# Patient Record
Sex: FEMALE | Race: BLACK OR AFRICAN AMERICAN | NOT HISPANIC OR LATINO
[De-identification: names, ages, dates, MRNs, and addresses within clinical notes are randomized per-mention and may not be internally consistent; named-entity substitution may affect disease eponyms.]

---

## 2017-01-03 ENCOUNTER — APPOINTMENT (OUTPATIENT)
Dept: SURGERY | Facility: CLINIC | Age: 52
End: 2017-01-03

## 2017-01-03 VITALS
DIASTOLIC BLOOD PRESSURE: 87 MMHG | HEIGHT: 71.5 IN | BODY MASS INDEX: 28.93 KG/M2 | SYSTOLIC BLOOD PRESSURE: 147 MMHG | OXYGEN SATURATION: 98 % | WEIGHT: 211.25 LBS | HEART RATE: 80 BPM | TEMPERATURE: 98.7 F

## 2017-01-03 DIAGNOSIS — Z87.19 PERSONAL HISTORY OF OTHER DISEASES OF THE DIGESTIVE SYSTEM: ICD-10-CM

## 2017-01-03 DIAGNOSIS — T14.8 OTHER INJURY OF UNSPECIFIED BODY REGION: ICD-10-CM

## 2017-01-03 DIAGNOSIS — Z86.718 PERSONAL HISTORY OF OTHER VENOUS THROMBOSIS AND EMBOLISM: ICD-10-CM

## 2017-01-03 DIAGNOSIS — Z78.9 OTHER SPECIFIED HEALTH STATUS: ICD-10-CM

## 2017-01-03 DIAGNOSIS — Z82.49 FAMILY HISTORY OF ISCHEMIC HEART DISEASE AND OTHER DISEASES OF THE CIRCULATORY SYSTEM: ICD-10-CM

## 2017-01-03 DIAGNOSIS — G47.9 SLEEP DISORDER, UNSPECIFIED: ICD-10-CM

## 2017-01-03 DIAGNOSIS — K27.5 CHRONIC OR UNSPECIFIED PEPTIC ULCER, SITE UNSPECIFIED, WITH PERFORATION: ICD-10-CM

## 2017-01-03 DIAGNOSIS — Z86.79 PERSONAL HISTORY OF OTHER DISEASES OF THE CIRCULATORY SYSTEM: ICD-10-CM

## 2017-01-03 RX ORDER — OMEPRAZOLE 40 MG/1
40 CAPSULE, DELAYED RELEASE ORAL
Qty: 30 | Refills: 0 | Status: ACTIVE | COMMUNITY
Start: 2016-09-21

## 2017-01-12 ENCOUNTER — EMERGENCY (EMERGENCY)
Facility: HOSPITAL | Age: 52
LOS: 1 days | Discharge: HOME | End: 2017-01-12
Attending: EMERGENCY MEDICINE | Admitting: EMERGENCY MEDICINE
Payer: COMMERCIAL

## 2017-01-12 VITALS
DIASTOLIC BLOOD PRESSURE: 89 MMHG | TEMPERATURE: 98 F | WEIGHT: 201.06 LBS | SYSTOLIC BLOOD PRESSURE: 139 MMHG | RESPIRATION RATE: 20 BRPM | HEART RATE: 84 BPM | HEIGHT: 71 IN | OXYGEN SATURATION: 97 %

## 2017-01-12 DIAGNOSIS — M54.9 DORSALGIA, UNSPECIFIED: ICD-10-CM

## 2017-01-12 DIAGNOSIS — M54.2 CERVICALGIA: ICD-10-CM

## 2017-01-12 DIAGNOSIS — Z98.89 OTHER SPECIFIED POSTPROCEDURAL STATES: Chronic | ICD-10-CM

## 2017-01-12 DIAGNOSIS — Z88.5 ALLERGY STATUS TO NARCOTIC AGENT: ICD-10-CM

## 2017-01-12 DIAGNOSIS — M54.41 LUMBAGO WITH SCIATICA, RIGHT SIDE: ICD-10-CM

## 2017-01-12 DIAGNOSIS — Z98.0 INTESTINAL BYPASS AND ANASTOMOSIS STATUS: Chronic | ICD-10-CM

## 2017-01-12 DIAGNOSIS — E78.5 HYPERLIPIDEMIA, UNSPECIFIED: ICD-10-CM

## 2017-01-12 DIAGNOSIS — Z90.89 ACQUIRED ABSENCE OF OTHER ORGANS: Chronic | ICD-10-CM

## 2017-01-12 DIAGNOSIS — I10 ESSENTIAL (PRIMARY) HYPERTENSION: ICD-10-CM

## 2017-01-12 PROCEDURE — 72125 CT NECK SPINE W/O DYE: CPT

## 2017-01-12 PROCEDURE — 72125 CT NECK SPINE W/O DYE: CPT | Mod: 26

## 2017-01-12 PROCEDURE — 99284 EMERGENCY DEPT VISIT MOD MDM: CPT

## 2017-01-12 PROCEDURE — 99284 EMERGENCY DEPT VISIT MOD MDM: CPT | Mod: 25

## 2017-01-12 RX ORDER — METHOCARBAMOL 500 MG/1
1 TABLET, FILM COATED ORAL
Qty: 9 | Refills: 0 | OUTPATIENT
Start: 2017-01-12 | End: 2017-01-15

## 2017-01-12 RX ORDER — IBUPROFEN 200 MG
800 TABLET ORAL ONCE
Qty: 0 | Refills: 0 | Status: COMPLETED | OUTPATIENT
Start: 2017-01-12 | End: 2017-01-12

## 2017-01-12 RX ORDER — IBUPROFEN 200 MG
1 TABLET ORAL
Qty: 30 | Refills: 0 | OUTPATIENT
Start: 2017-01-12

## 2017-01-12 RX ADMIN — Medication 800 MILLIGRAM(S): at 15:02

## 2017-01-12 RX ADMIN — Medication 800 MILLIGRAM(S): at 15:53

## 2017-01-12 RX ADMIN — Medication 200 MILLIGRAM(S): at 15:53
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
## 2017-01-12 NOTE — ED PROVIDER NOTE - CARE PLAN
Principal Discharge DX:	Acute low back pain with right-sided sciatica, unspecified back pain laterality  Secondary Diagnosis:	Neck pain, acute
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
## 2017-01-12 NOTE — ED ADULT NURSE NOTE - PMH
DVT (deep venous thrombosis)    HLD (hyperlipidemia)    HTN (hypertension)    Pancreatitis    PUD (peptic ulcer disease)
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
## 2017-01-12 NOTE — ED PROVIDER NOTE - ENMT, MLM
Airway patent, Nasal mucosa clear. Mouth with normal mucosa. Throat has no vesicles, no oropharyngeal exudates and uvula is midline. Very mild tenderness to C7-C8, and muscle around it

## 2017-01-12 NOTE — ED PROVIDER NOTE - PSH
H/O Billroth II operation    History of abdominoplasty    History of gastric surgery    History of tonsillectomy

## 2017-01-12 NOTE — ED PROVIDER NOTE - MUSCULOSKELETAL, MLM
Spine appears normal, range of motion is not limited, no muscle or joint tenderness. strength 5/5 in all extremities, sensation intact in all extremities

## 2017-01-17 ENCOUNTER — EMERGENCY (EMERGENCY)
Facility: HOSPITAL | Age: 52
LOS: 1 days | Discharge: HOME | End: 2017-01-17
Attending: EMERGENCY MEDICINE | Admitting: EMERGENCY MEDICINE
Payer: COMMERCIAL

## 2017-01-17 VITALS
SYSTOLIC BLOOD PRESSURE: 170 MMHG | RESPIRATION RATE: 19 BRPM | HEART RATE: 75 BPM | DIASTOLIC BLOOD PRESSURE: 99 MMHG | TEMPERATURE: 98 F | OXYGEN SATURATION: 99 %

## 2017-01-17 VITALS
TEMPERATURE: 98 F | WEIGHT: 213.85 LBS | SYSTOLIC BLOOD PRESSURE: 165 MMHG | RESPIRATION RATE: 18 BRPM | HEART RATE: 83 BPM | HEIGHT: 55 IN | OXYGEN SATURATION: 98 % | DIASTOLIC BLOOD PRESSURE: 95 MMHG

## 2017-01-17 DIAGNOSIS — Z98.89 OTHER SPECIFIED POSTPROCEDURAL STATES: Chronic | ICD-10-CM

## 2017-01-17 DIAGNOSIS — R10.12 LEFT UPPER QUADRANT PAIN: ICD-10-CM

## 2017-01-17 DIAGNOSIS — Z79.899 OTHER LONG TERM (CURRENT) DRUG THERAPY: ICD-10-CM

## 2017-01-17 DIAGNOSIS — R10.9 UNSPECIFIED ABDOMINAL PAIN: ICD-10-CM

## 2017-01-17 DIAGNOSIS — I10 ESSENTIAL (PRIMARY) HYPERTENSION: ICD-10-CM

## 2017-01-17 DIAGNOSIS — Z98.0 INTESTINAL BYPASS AND ANASTOMOSIS STATUS: Chronic | ICD-10-CM

## 2017-01-17 DIAGNOSIS — Z90.89 ACQUIRED ABSENCE OF OTHER ORGANS: Chronic | ICD-10-CM

## 2017-01-17 DIAGNOSIS — Z88.5 ALLERGY STATUS TO NARCOTIC AGENT: ICD-10-CM

## 2017-01-17 DIAGNOSIS — E78.00 PURE HYPERCHOLESTEROLEMIA, UNSPECIFIED: ICD-10-CM

## 2017-01-17 DIAGNOSIS — Z79.01 LONG TERM (CURRENT) USE OF ANTICOAGULANTS: ICD-10-CM

## 2017-01-17 DIAGNOSIS — K56.60 UNSPECIFIED INTESTINAL OBSTRUCTION: Chronic | ICD-10-CM

## 2017-01-17 PROCEDURE — 74177 CT ABD & PELVIS W/CONTRAST: CPT | Mod: 26

## 2017-01-17 PROCEDURE — 99285 EMERGENCY DEPT VISIT HI MDM: CPT

## 2017-01-17 PROCEDURE — 71020: CPT | Mod: 26

## 2017-01-17 RX ORDER — IOHEXOL 300 MG/ML
50 INJECTION, SOLUTION INTRAVENOUS ONCE
Qty: 0 | Refills: 0 | Status: COMPLETED | OUTPATIENT
Start: 2017-01-17 | End: 2017-01-17

## 2017-01-17 RX ORDER — MORPHINE SULFATE 50 MG/1
2 CAPSULE, EXTENDED RELEASE ORAL ONCE
Qty: 0 | Refills: 0 | Status: DISCONTINUED | OUTPATIENT
Start: 2017-01-17 | End: 2017-01-17

## 2017-01-17 RX ADMIN — MORPHINE SULFATE 2 MILLIGRAM(S): 50 CAPSULE, EXTENDED RELEASE ORAL at 14:13

## 2017-01-17 RX ADMIN — IOHEXOL 50 MILLILITER(S): 300 INJECTION, SOLUTION INTRAVENOUS at 14:13

## 2017-01-17 NOTE — CONSULT NOTE ADULT - PROBLEM SELECTOR RECOMMENDATION 9
no surgical intervention at this time  patient should see a gastroenterologist for EGD and biopsy/workup of suspicious area  if patient does not have a gastroenterologist she should see her PMD or Dr. Morelos for referral  Follow up with Dr. Morelos for local wound care as planned  Discussed with chief resident on call and attending
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
## 2017-01-17 NOTE — ED PROVIDER NOTE - GASTROINTESTINAL, MLM
Abdomen soft, mild left sided abd tenderness. no guarding. no rebound. no cva tenderness. ulcer present below umbilicus. no erythema

## 2017-01-17 NOTE — ED ADULT TRIAGE NOTE - CHIEF COMPLAINT QUOTE
Pt reports LUQ pain x 1 week ,  status post fall two week ago, was seen in the ED  a week ago .denies N/V , CP and discomfort.
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
## 2017-01-17 NOTE — ED PROVIDER NOTE - MEDICAL DECISION MAKING DETAILS
left sided abd pain. pt well appearing. labs noted. lipase negative. pain improved with meds in ED. ct scan done and ? abnormal loop of small bowel. surgery consulted and dispo pending surgical consult.
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
## 2017-01-17 NOTE — CONSULT NOTE ADULT - ASSESSMENT
60 yo female s/p Bilroth II with left sided flank pain after fall with incidental finding of mildly thickened small bowel loop asymmetrically on afferent loop.

## 2017-01-17 NOTE — ED PROVIDER NOTE - PROGRESS NOTE DETAILS
As per Surgery pt cleared for discharge, will recommend patient to follow up with a gastroenterologist for endoscopy for thickened loop of small bowel , however not needed on an emergent basis, will provide pt with gi follow up and pt to also f/u with dr. Morelos

## 2017-02-13 ENCOUNTER — APPOINTMENT (OUTPATIENT)
Dept: OBGYN | Facility: CLINIC | Age: 52
End: 2017-02-13

## 2017-02-19 ENCOUNTER — INPATIENT (INPATIENT)
Facility: HOSPITAL | Age: 52
LOS: 9 days | Discharge: HOME | DRG: 857 | End: 2017-03-01
Attending: SURGERY | Admitting: SURGERY
Payer: COMMERCIAL

## 2017-02-19 VITALS
DIASTOLIC BLOOD PRESSURE: 96 MMHG | TEMPERATURE: 98 F | OXYGEN SATURATION: 100 % | HEART RATE: 71 BPM | RESPIRATION RATE: 18 BRPM | WEIGHT: 218.7 LBS | SYSTOLIC BLOOD PRESSURE: 162 MMHG | HEIGHT: 71 IN

## 2017-02-19 DIAGNOSIS — Z90.89 ACQUIRED ABSENCE OF OTHER ORGANS: Chronic | ICD-10-CM

## 2017-02-19 DIAGNOSIS — Z98.0 INTESTINAL BYPASS AND ANASTOMOSIS STATUS: Chronic | ICD-10-CM

## 2017-02-19 DIAGNOSIS — K56.60 UNSPECIFIED INTESTINAL OBSTRUCTION: Chronic | ICD-10-CM

## 2017-02-19 DIAGNOSIS — L91.0 HYPERTROPHIC SCAR: ICD-10-CM

## 2017-02-19 DIAGNOSIS — Z98.89 OTHER SPECIFIED POSTPROCEDURAL STATES: Chronic | ICD-10-CM

## 2017-02-19 LAB
ALBUMIN SERPL ELPH-MCNC: 3.3 G/DL — LOW (ref 3.4–5)
ALP SERPL-CCNC: 113 U/L — SIGNIFICANT CHANGE UP (ref 40–120)
ALT FLD-CCNC: 19 U/L — SIGNIFICANT CHANGE UP (ref 12–42)
ANION GAP SERPL CALC-SCNC: 5 MMOL/L — LOW (ref 9–16)
AST SERPL-CCNC: 41 U/L — HIGH (ref 15–37)
BASOPHILS NFR BLD AUTO: 0.5 % — SIGNIFICANT CHANGE UP (ref 0–2)
BILIRUB SERPL-MCNC: 0.4 MG/DL — SIGNIFICANT CHANGE UP (ref 0.2–1.2)
BUN SERPL-MCNC: 13 MG/DL — SIGNIFICANT CHANGE UP (ref 7–23)
CALCIUM SERPL-MCNC: 9.3 MG/DL — SIGNIFICANT CHANGE UP (ref 8.5–10.5)
CHLORIDE SERPL-SCNC: 104 MMOL/L — SIGNIFICANT CHANGE UP (ref 96–108)
CO2 SERPL-SCNC: 28 MMOL/L — SIGNIFICANT CHANGE UP (ref 22–31)
CREAT SERPL-MCNC: 0.67 MG/DL — SIGNIFICANT CHANGE UP (ref 0.5–1.3)
EOSINOPHIL NFR BLD AUTO: 0.9 % — SIGNIFICANT CHANGE UP (ref 0–6)
GLUCOSE SERPL-MCNC: 101 MG/DL — HIGH (ref 70–99)
HCT VFR BLD CALC: 38.1 % — SIGNIFICANT CHANGE UP (ref 34.5–45)
HGB BLD-MCNC: 11.9 G/DL — SIGNIFICANT CHANGE UP (ref 11.5–15.5)
LYMPHOCYTES # BLD AUTO: 42.8 % — SIGNIFICANT CHANGE UP (ref 13–44)
MCHC RBC-ENTMCNC: 23.8 PG — LOW (ref 27–34)
MCHC RBC-ENTMCNC: 31.2 G/DL — LOW (ref 32–36)
MCV RBC AUTO: 76 FL — LOW (ref 80–100)
MONOCYTES NFR BLD AUTO: 7.8 % — SIGNIFICANT CHANGE UP (ref 2–14)
NEUTROPHILS NFR BLD AUTO: 48 % — SIGNIFICANT CHANGE UP (ref 43–77)
PLATELET # BLD AUTO: 314 K/UL — SIGNIFICANT CHANGE UP (ref 150–400)
POTASSIUM SERPL-MCNC: 5.1 MMOL/L — SIGNIFICANT CHANGE UP (ref 3.5–5.3)
POTASSIUM SERPL-SCNC: 5.1 MMOL/L — SIGNIFICANT CHANGE UP (ref 3.5–5.3)
PROT SERPL-MCNC: 8.3 G/DL — HIGH (ref 6.4–8.2)
RBC # BLD: 5.01 M/UL — SIGNIFICANT CHANGE UP (ref 3.8–5.2)
RBC # FLD: 16.1 % — SIGNIFICANT CHANGE UP (ref 10.3–16.9)
SODIUM SERPL-SCNC: 137 MMOL/L — SIGNIFICANT CHANGE UP (ref 135–145)
WBC # BLD: 5.8 K/UL — SIGNIFICANT CHANGE UP (ref 3.8–10.5)
WBC # FLD AUTO: 5.8 K/UL — SIGNIFICANT CHANGE UP (ref 3.8–10.5)

## 2017-02-19 PROCEDURE — 99285 EMERGENCY DEPT VISIT HI MDM: CPT | Mod: 25

## 2017-02-19 PROCEDURE — 93010 ELECTROCARDIOGRAM REPORT: CPT

## 2017-02-19 NOTE — ED PROVIDER NOTE - OBJECTIVE STATEMENT
62 y/o f hx billroth II procedure in 2014 with residual wound which didn't completely heal with continuous drainage presents stating she saw Dr. Morelos this past week who has been following her and was advised to come to ED if drainage persists, which she reports it has.  Denies fever, abd pain, n/v/d, all other ROS negative.

## 2017-02-19 NOTE — ED PROVIDER NOTE - ATTENDING CONTRIBUTION TO CARE
60 yo female with non healing abd wound from billroth II 2014- no fevers or chills no s/s of sepsis will req  admission for debridement and wound closure

## 2017-02-19 NOTE — ED ADULT NURSE NOTE - OBJECTIVE STATEMENT
Patient presents with small wound to abdomen that is located at end of surgical scar from a surgery in 2014. Would has purulent drainage. Will continue to monitor patient.

## 2017-02-19 NOTE — ED PROVIDER NOTE - MEDICAL DECISION MAKING DETAILS
62 y/o f with drainage from wound from surgical incision; afebrile and wound does not appear infected, surgery contacted as Dr. Morelos advised pt to come to ED, labs sent.  Pt had CT a/p 1 month ago, will not repeat at this time.  Will f/u surgery recommendations.

## 2017-02-19 NOTE — ED ADULT TRIAGE NOTE - CHIEF COMPLAINT QUOTE
wound check, pt had abdominal sx 2014 , in the operative area there a small wound not been closed, possible infected as per her

## 2017-02-19 NOTE — ED PROVIDER NOTE - GASTROINTESTINAL, MLM
abdomen soft, no tenderness, midline incision healed apart from 1 cm opening in lower abd with serous drainage, no pus, no tenderness, no erythema, no fluctuance

## 2017-02-20 ENCOUNTER — APPOINTMENT (OUTPATIENT)
Dept: SURGERY | Facility: HOSPITAL | Age: 52
End: 2017-02-20

## 2017-02-20 DIAGNOSIS — Z98.890 OTHER SPECIFIED POSTPROCEDURAL STATES: Chronic | ICD-10-CM

## 2017-02-20 DIAGNOSIS — Z98.0 INTESTINAL BYPASS AND ANASTOMOSIS STATUS: Chronic | ICD-10-CM

## 2017-02-20 LAB
ANION GAP SERPL CALC-SCNC: 8 MMOL/L — LOW (ref 9–16)
APTT BLD: 32.3 SEC — SIGNIFICANT CHANGE UP (ref 27.5–37.4)
BUN SERPL-MCNC: 12 MG/DL — SIGNIFICANT CHANGE UP (ref 7–23)
CALCIUM SERPL-MCNC: 8.7 MG/DL — SIGNIFICANT CHANGE UP (ref 8.5–10.5)
CHLORIDE SERPL-SCNC: 103 MMOL/L — SIGNIFICANT CHANGE UP (ref 96–108)
CO2 SERPL-SCNC: 28 MMOL/L — SIGNIFICANT CHANGE UP (ref 22–31)
CREAT SERPL-MCNC: 0.52 MG/DL — SIGNIFICANT CHANGE UP (ref 0.5–1.3)
GLUCOSE SERPL-MCNC: 92 MG/DL — SIGNIFICANT CHANGE UP (ref 70–99)
HCT VFR BLD CALC: 36.7 % — SIGNIFICANT CHANGE UP (ref 34.5–45)
HGB BLD-MCNC: 11.1 G/DL — LOW (ref 11.5–15.5)
MAGNESIUM SERPL-MCNC: 2 MG/DL — SIGNIFICANT CHANGE UP (ref 1.6–2.4)
MCHC RBC-ENTMCNC: 23.3 PG — LOW (ref 27–34)
MCHC RBC-ENTMCNC: 30.2 G/DL — LOW (ref 32–36)
MCV RBC AUTO: 77.1 FL — LOW (ref 80–100)
PHOSPHATE SERPL-MCNC: 4 MG/DL — SIGNIFICANT CHANGE UP (ref 2.5–4.5)
PLATELET # BLD AUTO: 267 K/UL — SIGNIFICANT CHANGE UP (ref 150–400)
POTASSIUM SERPL-MCNC: 3.6 MMOL/L — SIGNIFICANT CHANGE UP (ref 3.5–5.3)
POTASSIUM SERPL-SCNC: 3.6 MMOL/L — SIGNIFICANT CHANGE UP (ref 3.5–5.3)
RBC # BLD: 4.76 M/UL — SIGNIFICANT CHANGE UP (ref 3.8–5.2)
RBC # FLD: 16 % — SIGNIFICANT CHANGE UP (ref 10.3–16.9)
SODIUM SERPL-SCNC: 139 MMOL/L — SIGNIFICANT CHANGE UP (ref 135–145)
WBC # BLD: 4.7 K/UL — SIGNIFICANT CHANGE UP (ref 3.8–10.5)
WBC # FLD AUTO: 4.7 K/UL — SIGNIFICANT CHANGE UP (ref 3.8–10.5)

## 2017-02-20 PROCEDURE — 99222 1ST HOSP IP/OBS MODERATE 55: CPT | Mod: 57,GC

## 2017-02-20 PROCEDURE — 97605 NEG PRS WND THER DME<=50SQCM: CPT | Mod: GC

## 2017-02-20 PROCEDURE — 11000 DBRDMT ECZ/INFECTED SKIN<10%: CPT | Mod: 59,GC

## 2017-02-20 RX ORDER — HYDROMORPHONE HYDROCHLORIDE 2 MG/ML
0.5 INJECTION INTRAMUSCULAR; INTRAVENOUS; SUBCUTANEOUS
Qty: 0 | Refills: 0 | Status: DISCONTINUED | OUTPATIENT
Start: 2017-02-20 | End: 2017-02-20

## 2017-02-20 RX ORDER — HYDROMORPHONE HYDROCHLORIDE 2 MG/ML
0.5 INJECTION INTRAMUSCULAR; INTRAVENOUS; SUBCUTANEOUS EVERY 4 HOURS
Qty: 0 | Refills: 0 | Status: DISCONTINUED | OUTPATIENT
Start: 2017-02-20 | End: 2017-02-21

## 2017-02-20 RX ORDER — HEPARIN SODIUM 5000 [USP'U]/ML
5000 INJECTION INTRAVENOUS; SUBCUTANEOUS EVERY 8 HOURS
Qty: 0 | Refills: 0 | Status: DISCONTINUED | OUTPATIENT
Start: 2017-02-20 | End: 2017-03-01

## 2017-02-20 RX ORDER — INFLUENZA VIRUS VACCINE 15; 15; 15; 15 UG/.5ML; UG/.5ML; UG/.5ML; UG/.5ML
0.5 SUSPENSION INTRAMUSCULAR ONCE
Qty: 0 | Refills: 0 | Status: COMPLETED | OUTPATIENT
Start: 2017-02-20 | End: 2017-02-20

## 2017-02-20 RX ORDER — HYDROMORPHONE HYDROCHLORIDE 2 MG/ML
0.5 INJECTION INTRAMUSCULAR; INTRAVENOUS; SUBCUTANEOUS EVERY 4 HOURS
Qty: 0 | Refills: 0 | Status: DISCONTINUED | OUTPATIENT
Start: 2017-02-20 | End: 2017-02-20

## 2017-02-20 RX ORDER — METOCLOPRAMIDE HCL 10 MG
10 TABLET ORAL ONCE
Qty: 0 | Refills: 0 | Status: DISCONTINUED | OUTPATIENT
Start: 2017-02-20 | End: 2017-02-27

## 2017-02-20 RX ORDER — HYDROMORPHONE HYDROCHLORIDE 2 MG/ML
1 INJECTION INTRAMUSCULAR; INTRAVENOUS; SUBCUTANEOUS EVERY 4 HOURS
Qty: 0 | Refills: 0 | Status: DISCONTINUED | OUTPATIENT
Start: 2017-02-20 | End: 2017-02-21

## 2017-02-20 RX ORDER — AMPICILLIN SODIUM AND SULBACTAM SODIUM 250; 125 MG/ML; MG/ML
3 INJECTION, POWDER, FOR SUSPENSION INTRAMUSCULAR; INTRAVENOUS EVERY 6 HOURS
Qty: 0 | Refills: 0 | Status: DISCONTINUED | OUTPATIENT
Start: 2017-02-20 | End: 2017-02-22

## 2017-02-20 RX ORDER — ONDANSETRON 8 MG/1
4 TABLET, FILM COATED ORAL EVERY 6 HOURS
Qty: 0 | Refills: 0 | Status: DISCONTINUED | OUTPATIENT
Start: 2017-02-20 | End: 2017-03-01

## 2017-02-20 RX ORDER — POTASSIUM CHLORIDE 20 MEQ
10 PACKET (EA) ORAL
Qty: 0 | Refills: 0 | Status: COMPLETED | OUTPATIENT
Start: 2017-02-20 | End: 2017-02-20

## 2017-02-20 RX ORDER — SODIUM CHLORIDE 9 MG/ML
1000 INJECTION, SOLUTION INTRAVENOUS
Qty: 0 | Refills: 0 | Status: DISCONTINUED | OUTPATIENT
Start: 2017-02-20 | End: 2017-02-21

## 2017-02-20 RX ADMIN — ONDANSETRON 4 MILLIGRAM(S): 8 TABLET, FILM COATED ORAL at 21:26

## 2017-02-20 RX ADMIN — HYDROMORPHONE HYDROCHLORIDE 0.5 MILLIGRAM(S): 2 INJECTION INTRAMUSCULAR; INTRAVENOUS; SUBCUTANEOUS at 18:24

## 2017-02-20 RX ADMIN — Medication 100 MILLIEQUIVALENT(S): at 15:28

## 2017-02-20 RX ADMIN — HYDROMORPHONE HYDROCHLORIDE 0.5 MILLIGRAM(S): 2 INJECTION INTRAMUSCULAR; INTRAVENOUS; SUBCUTANEOUS at 18:25

## 2017-02-20 RX ADMIN — HYDROMORPHONE HYDROCHLORIDE 0.5 MILLIGRAM(S): 2 INJECTION INTRAMUSCULAR; INTRAVENOUS; SUBCUTANEOUS at 17:56

## 2017-02-20 RX ADMIN — Medication 100 MILLIEQUIVALENT(S): at 12:04

## 2017-02-20 RX ADMIN — HEPARIN SODIUM 5000 UNIT(S): 5000 INJECTION INTRAVENOUS; SUBCUTANEOUS at 05:55

## 2017-02-20 RX ADMIN — SODIUM CHLORIDE 100 MILLILITER(S): 9 INJECTION, SOLUTION INTRAVENOUS at 06:00

## 2017-02-20 RX ADMIN — HEPARIN SODIUM 5000 UNIT(S): 5000 INJECTION INTRAVENOUS; SUBCUTANEOUS at 21:26

## 2017-02-20 RX ADMIN — Medication 100 MILLIEQUIVALENT(S): at 13:42

## 2017-02-20 RX ADMIN — HEPARIN SODIUM 5000 UNIT(S): 5000 INJECTION INTRAVENOUS; SUBCUTANEOUS at 13:42

## 2017-02-20 NOTE — H&P ADULT. - ASSESSMENT
61F pt with extensive surgical history now with chronic incisional granulating ulcer    -Admit to Surgery, [facility], [facility]  -NPO  -IVF  -PRN pain meds  -Patient states she has not taken her antihypertensive meds(hydrochlorothizide) since 2014 as her PCP told her she didnt need them, will reassess need for medications  -Add on for after 2pm for Dr. Mena, Exploration of abdominal wall, wound debridement, placement of VAC 61F pt with extensive surgical history now with chronic incisional granulating ulcer    -Admit to Surgery, [facility], [facility]  -NPO  -IVF  -PRN pain meds  -Patient states she has not taken her antihypertensive meds(hydrochlorothizide) since 2014 as her PCP told her she didnt need them, will reassess need for medications  -Add on for after 2pm for Dr. Morelos/Rajesh, Exploration of abdominal wall, wound debridement, placement of VAC

## 2017-02-20 NOTE — H&P ADULT. - ATTENDING COMMENTS
Patient seen and examined at bedside. Agree with above.  To OR for wound exploration/debridement, VAC placement.

## 2017-02-20 NOTE — PROGRESS NOTE ADULT - SUBJECTIVE AND OBJECTIVE BOX
O/N: admitted, added onto OR schedule    SUBJECTIVE:  Flatus: [ ] YES [ ] NO             Bowel Movement: [ ] YES [ ] NO  Pain (0-10):            Pain Control Adequate: [x ] YES [ ] NO  Nausea: [ ] YES [ x] NO            Vomiting: [ ] YES [ x] NO  Diarrhea: [ ] YES [ ] NO         Constipation: [ ] YES [ ] NO     Chest Pain: [ ] YES [x ] NO    SOB:  [ ] YES [ x] NO    MEDICATIONS  (STANDING):  heparin  Injectable 5000Unit(s) SubCutaneous every 8 hours  lactated ringers. 1000milliLiter(s) IV Continuous <Continuous>    MEDICATIONS  (PRN):  HYDROmorphone  Injectable 0.5milliGRAM(s) IV Push every 4 hours PRN Severe Pain (7 - 10)  ondansetron Injectable 4milliGRAM(s) IV Push every 6 hours PRN Nausea      Vital Signs Last 24 Hrs  T(C): 36.8, Max: 36.8 (02-19 @ 23:38)  T(F): 98.2, Max: 98.2 (02-19 @ 23:38)  HR: 72 (67 - 79)  BP: 141/95 (125/74 - 162/96)  BP(mean): --  RR: 16 (16 - 18)  SpO2: 95% (95% - 100%)    Physical Exam:  General: NAD, resting comfortably in bed  Pulmonary: Nonlabored breathing, no respiratory distress  Cardiovascular: NSR  Abdominal: soft, NT/ND, dry ulcer at bottom of midline scar  Extremities: WWP, normal strength  Neuro: A/O x 3, CNs II-XII grossly intact, no focal deficits, normal motor/sensation  Pulses: palpable distal pulses    I&O's Summary    I & Os for current day (as of 20 Feb 2017 11:22)  =============================================  IN: 350 ml / OUT: 0 ml / NET: 350 ml      LABS:                        11.1   4.7   )-----------( 267      ( 20 Feb 2017 06:58 )             36.7     20 Feb 2017 06:58    139    |  103    |  12     ----------------------------<  92     3.6     |  28     |  0.52     Ca    8.7        20 Feb 2017 06:58  Phos  4.0       20 Feb 2017 06:58  Mg     2.0       20 Feb 2017 06:58    TPro  8.3    /  Alb  3.3    /  TBili  0.4    /  DBili  x      /  AST  41     /  ALT  19     /  AlkPhos  113    19 Feb 2017 22:10    PTT - ( 20 Feb 2017 06:58 )  PTT:32.3 sec    CAPILLARY BLOOD GLUCOSE    LIVER FUNCTIONS - ( 19 Feb 2017 22:10 )  Alb: 3.3 g/dL / Pro: 8.3 g/dL / ALK PHOS: 113 U/L / ALT: 19 U/L / AST: 41 U/L / GGT: x             RADIOLOGY & ADDITIONAL STUDIES:

## 2017-02-20 NOTE — H&P ADULT. - GASTROINTESTINAL DETAILS
no guarding/no masses palpable/nontender/no rebound tenderness/bowel sounds normal/soft/no rigidity/no distention

## 2017-02-20 NOTE — H&P ADULT. - PSH
Bowel obstruction Bowel obstruction    History of abdominoplasty    History of Billroth II operation

## 2017-02-20 NOTE — H&P ADULT. - --DESCRIBE SURGICAL SITE
From prior surgery in 2014, chronic non-healing lower midline with keloid formation of upper midline wound

## 2017-02-20 NOTE — PROGRESS NOTE ADULT - SUBJECTIVE AND OBJECTIVE BOX
POST-OPERATIVE NOTE    Procedure: Wound debridement and vac placement    Diagnosis/Indication: chronic incisional granulating ulcer    Surgeon: Dr. Morelos and Dr. Mena    S: Pt seen and examined at bedside currently having just had a small amount of emesis and is now feeling less nauseous post emesis. Denies CP, SOB, dyspnea. Pain controlled with medication. Reports a voiding a small amount so far that was not recorded.    O:  T(C): 36.2, Max: 36.2 (02-20 @ 17:35)  T(F): 97.1, Max: 97.1 (02-20 @ 17:35)  HR: 74 (69 - 83)  BP: 145/84 (145/84 - 152/82)  RR: 12 (12 - 16)  SpO2: 100% (100% - 100%)  Wt(kg): --                        11.1   4.7   )-----------( 267      ( 20 Feb 2017 06:58 )             36.7     20 Feb 2017 06:58    139    |  103    |  12     ----------------------------<  92     3.6     |  28     |  0.52     Ca    8.7        20 Feb 2017 06:58  Phos  4.0       20 Feb 2017 06:58  Mg     2.0       20 Feb 2017 06:58    TPro  8.3    /  Alb  3.3    /  TBili  0.4    /  DBili  x      /  AST  41     /  ALT  19     /  AlkPhos  113    19 Feb 2017 22:10      Gen: NAD, resting comfortably in bed  C/V: RRR  Pulm: Nonlabored breathing, no respiratory distress, CTAB, IS 1250  Abd: soft, NT/ND, vac functioning and in place  Extrem: WWP, minimal peripheral edema, SCDs in place      A/P: 61y Female s/p debridement of chronic draining abdominal wound, now with NPWT dressing  Diet: CLD this evening will ADAT  IVF until tolerating po  Pain/nausea control  IV Unasyn until OR Cx return  SQH/SCDs  Plan for wound closure revision this friday  Encourage OOBA/IS  AM labs

## 2017-02-20 NOTE — PROGRESS NOTE ADULT - ASSESSMENT
61F pt with extensive surgical history now with chronic incisional granulating ulcer    - NPO/IVF  - PRN pain/nausea meds  - OR today for abdominal wall, wound debridement, placement of VAC  - SQH/SCDs  - OOBA/IS  - Abx not currently indicated

## 2017-02-20 NOTE — H&P ADULT. - HISTORY OF PRESENT ILLNESS
61F pt w/PMH HTN, PUD s/p Bilroth II, Gallstone pancreatitis, s/p Ex Lap for SBO c/b wound infection, and chronic wound presents to [facility] for chronic wound at distal midline laparotomy scar. Says that the wound has never healed, was intervened previously without any resolution. Drainage varies during the days, never has noticed any assc pattern with the drainage. She denies fevers, chills, nausea vomiting, constipation.  In the ED she is afebrile, normal vitals and labs.

## 2017-02-21 LAB
ANION GAP SERPL CALC-SCNC: 9 MMOL/L — SIGNIFICANT CHANGE UP (ref 9–16)
BUN SERPL-MCNC: 8 MG/DL — SIGNIFICANT CHANGE UP (ref 7–23)
CALCIUM SERPL-MCNC: 9.4 MG/DL — SIGNIFICANT CHANGE UP (ref 8.5–10.5)
CHLORIDE SERPL-SCNC: 103 MMOL/L — SIGNIFICANT CHANGE UP (ref 96–108)
CO2 SERPL-SCNC: 27 MMOL/L — SIGNIFICANT CHANGE UP (ref 22–31)
CREAT SERPL-MCNC: 0.53 MG/DL — SIGNIFICANT CHANGE UP (ref 0.5–1.3)
GLUCOSE SERPL-MCNC: 117 MG/DL — HIGH (ref 70–99)
GRAM STN FLD: SIGNIFICANT CHANGE UP
HCT VFR BLD CALC: 38.2 % — SIGNIFICANT CHANGE UP (ref 34.5–45)
HGB BLD-MCNC: 12.1 G/DL — SIGNIFICANT CHANGE UP (ref 11.5–15.5)
MAGNESIUM SERPL-MCNC: 1.9 MG/DL — SIGNIFICANT CHANGE UP (ref 1.6–2.4)
MCHC RBC-ENTMCNC: 24.3 PG — LOW (ref 27–34)
MCHC RBC-ENTMCNC: 31.7 G/DL — LOW (ref 32–36)
MCV RBC AUTO: 76.9 FL — LOW (ref 80–100)
PHOSPHATE SERPL-MCNC: 3.3 MG/DL — SIGNIFICANT CHANGE UP (ref 2.5–4.5)
PLATELET # BLD AUTO: 273 K/UL — SIGNIFICANT CHANGE UP (ref 150–400)
POTASSIUM SERPL-MCNC: 4.1 MMOL/L — SIGNIFICANT CHANGE UP (ref 3.5–5.3)
POTASSIUM SERPL-SCNC: 4.1 MMOL/L — SIGNIFICANT CHANGE UP (ref 3.5–5.3)
RBC # BLD: 4.97 M/UL — SIGNIFICANT CHANGE UP (ref 3.8–5.2)
RBC # FLD: 15.8 % — SIGNIFICANT CHANGE UP (ref 10.3–16.9)
SODIUM SERPL-SCNC: 139 MMOL/L — SIGNIFICANT CHANGE UP (ref 135–145)
SPECIMEN SOURCE: SIGNIFICANT CHANGE UP
WBC # BLD: 5 K/UL — SIGNIFICANT CHANGE UP (ref 3.8–10.5)
WBC # FLD AUTO: 5 K/UL — SIGNIFICANT CHANGE UP (ref 3.8–10.5)

## 2017-02-21 RX ORDER — LACTOBACILLUS ACIDOPHILUS 100MM CELL
1 CAPSULE ORAL
Qty: 0 | Refills: 0 | Status: DISCONTINUED | OUTPATIENT
Start: 2017-02-21 | End: 2017-03-01

## 2017-02-21 RX ORDER — ACETAMINOPHEN 500 MG
650 TABLET ORAL EVERY 6 HOURS
Qty: 0 | Refills: 0 | Status: DISCONTINUED | OUTPATIENT
Start: 2017-02-21 | End: 2017-03-01

## 2017-02-21 RX ADMIN — HEPARIN SODIUM 5000 UNIT(S): 5000 INJECTION INTRAVENOUS; SUBCUTANEOUS at 13:27

## 2017-02-21 RX ADMIN — AMPICILLIN SODIUM AND SULBACTAM SODIUM 200 GRAM(S): 250; 125 INJECTION, POWDER, FOR SUSPENSION INTRAMUSCULAR; INTRAVENOUS at 06:02

## 2017-02-21 RX ADMIN — AMPICILLIN SODIUM AND SULBACTAM SODIUM 200 GRAM(S): 250; 125 INJECTION, POWDER, FOR SUSPENSION INTRAMUSCULAR; INTRAVENOUS at 18:00

## 2017-02-21 RX ADMIN — HEPARIN SODIUM 5000 UNIT(S): 5000 INJECTION INTRAVENOUS; SUBCUTANEOUS at 21:50

## 2017-02-21 RX ADMIN — Medication 1 TABLET(S): at 13:30

## 2017-02-21 RX ADMIN — AMPICILLIN SODIUM AND SULBACTAM SODIUM 200 GRAM(S): 250; 125 INJECTION, POWDER, FOR SUSPENSION INTRAMUSCULAR; INTRAVENOUS at 13:27

## 2017-02-21 RX ADMIN — AMPICILLIN SODIUM AND SULBACTAM SODIUM 200 GRAM(S): 250; 125 INJECTION, POWDER, FOR SUSPENSION INTRAMUSCULAR; INTRAVENOUS at 00:09

## 2017-02-21 RX ADMIN — Medication 1 TABLET(S): at 18:30

## 2017-02-21 RX ADMIN — HEPARIN SODIUM 5000 UNIT(S): 5000 INJECTION INTRAVENOUS; SUBCUTANEOUS at 06:02

## 2017-02-21 RX ADMIN — AMPICILLIN SODIUM AND SULBACTAM SODIUM 200 GRAM(S): 250; 125 INJECTION, POWDER, FOR SUSPENSION INTRAMUSCULAR; INTRAVENOUS at 23:41

## 2017-02-21 NOTE — PROGRESS NOTE ADULT - SUBJECTIVE AND OBJECTIVE BOX
O/N: POC WNL, moderate nausea, improving, passed TOV, IS 1250.  2/20: OR for wound debridement/washout and vac placement    STATUS POST: Chronic wound debridement and vac placement    POD: 1 O/N: POC WNL, moderate nausea, improving, passed TOV, IS 1250.  2/20: OR for wound debridement/washout and vac placement    STATUS POST: Chronic wound debridement and vac placement    POD: 1        SUBJECTIVE:  Flatus: [ ] YES [ ] NO             Bowel Movement: [ ] YES [ ] NO  Pain (0-10):            Pain Control Adequate: [x ] YES [ ] NO  Nausea: [x ] YES [ ] NO            Vomiting: [ ] YES [x ] NO  Diarrhea: [ ] YES [ ] NO         Constipation: [ ] YES [ ] NO     Chest Pain: [ ] YES [x ] NO    SOB:  [ ] YES [ x] NO    MEDICATIONS  (STANDING):  heparin  Injectable 5000Unit(s) SubCutaneous every 8 hours  lactated ringers. 1000milliLiter(s) IV Continuous <Continuous>  ampicillin/sulbactam  IVPB 3Gram(s) IV Intermittent every 6 hours  metoclopramide Injectable 10milliGRAM(s) IV Push once    MEDICATIONS  (PRN):  ondansetron Injectable 4milliGRAM(s) IV Push every 6 hours PRN Nausea  acetaminophen   Tablet. 650milliGRAM(s) Oral every 6 hours PRN Moderate Pain (4 - 6)  oxyCODONE  5 mG/acetaminophen 325 mG 1Tablet(s) Oral every 4 hours PRN Severe Pain (7 - 10)      Vital Signs Last 24 Hrs  T(C): 36.3, Max: 36.9 (02-20 @ 14:19)  T(F): 97.3, Max: 98.5 (02-20 @ 14:19)  HR: 80 (64 - 83)  BP: 136/83 (132/85 - 152/82)  BP(mean): --  RR: 16 (12 - 17)  SpO2: 99% (95% - 100%)    Physical Exam:  General: NAD, resting comfortably in bed  Pulmonary: Nonlabored breathing, no respiratory distress  Cardiovascular: NSR  Abdominal: soft, ND, wound vac in place, minimal tenderness  Extremities: WWP, normal strength  Neuro: A/O x 3, CNs II-XII grossly intact, no focal deficits, normal motor/sensation  Pulses: palpable distal pulses    I&O's Summary    I & Os for current day (as of 21 Feb 2017 08:19)  =============================================  IN: 1200 ml / OUT: 2250 ml / NET: -1050 ml      LABS:                        12.1   5.0   )-----------( 273      ( 21 Feb 2017 07:22 )             38.2     21 Feb 2017 07:21    139    |  103    |  8      ----------------------------<  117    4.1     |  27     |  0.53     Ca    9.4        21 Feb 2017 07:21  Phos  3.3       21 Feb 2017 07:21  Mg     1.9       21 Feb 2017 07:21    TPro  8.3    /  Alb  3.3    /  TBili  0.4    /  DBili  x      /  AST  41     /  ALT  19     /  AlkPhos  113    19 Feb 2017 22:10    PTT - ( 20 Feb 2017 06:58 )  PTT:32.3 sec    CAPILLARY BLOOD GLUCOSE    LIVER FUNCTIONS - ( 19 Feb 2017 22:10 )  Alb: 3.3 g/dL / Pro: 8.3 g/dL / ALK PHOS: 113 U/L / ALT: 19 U/L / AST: 41 U/L / GGT: x             RADIOLOGY & ADDITIONAL STUDIES:

## 2017-02-21 NOTE — PROGRESS NOTE ADULT - ATTENDING COMMENTS
Patient seen and examined at bedside.  Agree with above.  C/O nausea/vomiting post-op.  Improved now.  Abdomen soft, NT ND.  VAC in place.  Impression: 61F s/p wound debridement and VAC dressing placement POD #1  Plan: Advance diet as tolerated  -VAC to 125 mm Hg suction  -OOB/ambulation  -Unasyn pending cultures from OR  -Probiotics  -Pain control Patient seen and examined at bedside.  Agree with above.  C/O nausea/vomiting post-op.  Improved now.  Abdomen soft, NT ND.  VAC in place.  Impression: 61F s/p wound debridement and VAC dressing placement POD #1  Plan: Advance diet as tolerated  -VAC to 125 mm Hg suction  -OOB/ambulation  -Unasyn pending cultures from OR  -Probiotics  -Pain control  -Bedside VAC change 2/22, plan to return to OR 2/24 for wound debridement and closure

## 2017-02-21 NOTE — PROGRESS NOTE ADULT - SUBJECTIVE AND OBJECTIVE BOX
pt w/o any c/o (+) flatus  AF/VSS  abd VAC in place  A/P 1. f/u wound cx;  cont IV abx  2. bedside VAC change tmw  3. dvt ppx, oob  4.possible Or fri for closure

## 2017-02-21 NOTE — PROGRESS NOTE ADULT - ASSESSMENT
61y Female s/p debridement of chronic draining abdominal wound, now with NPWT dressing  ADAT  IVF until tolerating po  Pain/nausea control  IV Unasyn until OR Cx return  SQH/SCDs  Plan for wound closure revision this friday  Encourage OOBA/IS  AM labs 61y Female s/p debridement of chronic draining abdominal wound, now with NPWT dressing  ADAT  IVF until tolerating po  Pain/nausea control  IV Unasyn until OR Cx return  Probiotics  SQH/SCDs  Plan for wound closure revision this friday  Bedside vac change tomorrow (Wed)  Encourage OOBA/IS  AM labs

## 2017-02-22 LAB
-  CEFAZOLIN: SIGNIFICANT CHANGE UP
-  CLINDAMYCIN: SIGNIFICANT CHANGE UP
-  ERYTHROMYCIN: SIGNIFICANT CHANGE UP
-  LINEZOLID: SIGNIFICANT CHANGE UP
-  OXACILLIN: SIGNIFICANT CHANGE UP
-  PENICILLIN: SIGNIFICANT CHANGE UP
-  RIFAMPIN: SIGNIFICANT CHANGE UP
-  TRIMETHOPRIM/SULFAMETHOXAZOLE: SIGNIFICANT CHANGE UP
-  VANCOMYCIN: SIGNIFICANT CHANGE UP
ANION GAP SERPL CALC-SCNC: 7 MMOL/L — LOW (ref 9–16)
APPEARANCE UR: CLEAR — SIGNIFICANT CHANGE UP
BACTERIA # UR AUTO: PRESENT /HPF
BILIRUB UR-MCNC: NEGATIVE — SIGNIFICANT CHANGE UP
BUN SERPL-MCNC: 14 MG/DL — SIGNIFICANT CHANGE UP (ref 7–23)
CALCIUM SERPL-MCNC: 9 MG/DL — SIGNIFICANT CHANGE UP (ref 8.5–10.5)
CHLORIDE SERPL-SCNC: 104 MMOL/L — SIGNIFICANT CHANGE UP (ref 96–108)
CO2 SERPL-SCNC: 27 MMOL/L — SIGNIFICANT CHANGE UP (ref 22–31)
COLOR SPEC: YELLOW — SIGNIFICANT CHANGE UP
COMMENT - URINE: SIGNIFICANT CHANGE UP
CREAT SERPL-MCNC: 0.64 MG/DL — SIGNIFICANT CHANGE UP (ref 0.5–1.3)
CULTURE RESULTS: SIGNIFICANT CHANGE UP
DIFF PNL FLD: (no result)
EPI CELLS # UR: (no result) /HPF
GLUCOSE SERPL-MCNC: 90 MG/DL — SIGNIFICANT CHANGE UP (ref 70–99)
GLUCOSE UR QL: NEGATIVE — SIGNIFICANT CHANGE UP
HCT VFR BLD CALC: 36.5 % — SIGNIFICANT CHANGE UP (ref 34.5–45)
HGB BLD-MCNC: 11.4 G/DL — LOW (ref 11.5–15.5)
HYALINE CASTS # UR AUTO: (no result) /LPF
KETONES UR-MCNC: NEGATIVE — SIGNIFICANT CHANGE UP
LEUKOCYTE ESTERASE UR-ACNC: NEGATIVE — SIGNIFICANT CHANGE UP
MAGNESIUM SERPL-MCNC: 2.2 MG/DL — SIGNIFICANT CHANGE UP (ref 1.6–2.4)
MCHC RBC-ENTMCNC: 24 PG — LOW (ref 27–34)
MCHC RBC-ENTMCNC: 31.2 G/DL — LOW (ref 32–36)
MCV RBC AUTO: 76.8 FL — LOW (ref 80–100)
METHOD TYPE: SIGNIFICANT CHANGE UP
NITRITE UR-MCNC: NEGATIVE — SIGNIFICANT CHANGE UP
ORGANISM # SPEC MICROSCOPIC CNT: SIGNIFICANT CHANGE UP
PH UR: 5.5 — SIGNIFICANT CHANGE UP (ref 4–8)
PHOSPHATE SERPL-MCNC: 2.9 MG/DL — SIGNIFICANT CHANGE UP (ref 2.5–4.5)
PLATELET # BLD AUTO: 231 K/UL — SIGNIFICANT CHANGE UP (ref 150–400)
POTASSIUM SERPL-MCNC: 3.6 MMOL/L — SIGNIFICANT CHANGE UP (ref 3.5–5.3)
POTASSIUM SERPL-SCNC: 3.6 MMOL/L — SIGNIFICANT CHANGE UP (ref 3.5–5.3)
PROT UR-MCNC: NEGATIVE MG/DL — SIGNIFICANT CHANGE UP
RBC # BLD: 4.75 M/UL — SIGNIFICANT CHANGE UP (ref 3.8–5.2)
RBC # FLD: 16 % — SIGNIFICANT CHANGE UP (ref 10.3–16.9)
RBC CASTS # UR COMP ASSIST: < 5 /HPF — SIGNIFICANT CHANGE UP
SODIUM SERPL-SCNC: 138 MMOL/L — SIGNIFICANT CHANGE UP (ref 135–145)
SP GR SPEC: 1.02 — SIGNIFICANT CHANGE UP (ref 1–1.03)
SPECIMEN SOURCE: SIGNIFICANT CHANGE UP
SURGICAL PATHOLOGY STUDY: SIGNIFICANT CHANGE UP
UROBILINOGEN FLD QL: 0.2 E.U./DL — SIGNIFICANT CHANGE UP
WBC # BLD: 6.4 K/UL — SIGNIFICANT CHANGE UP (ref 3.8–10.5)
WBC # FLD AUTO: 6.4 K/UL — SIGNIFICANT CHANGE UP (ref 3.8–10.5)
WBC UR QL: (no result) /HPF

## 2017-02-22 RX ORDER — MORPHINE SULFATE 50 MG/1
2 CAPSULE, EXTENDED RELEASE ORAL ONCE
Qty: 0 | Refills: 0 | Status: DISCONTINUED | OUTPATIENT
Start: 2017-02-22 | End: 2017-02-22

## 2017-02-22 RX ADMIN — MORPHINE SULFATE 2 MILLIGRAM(S): 50 CAPSULE, EXTENDED RELEASE ORAL at 08:50

## 2017-02-22 RX ADMIN — Medication 1 TABLET(S): at 18:27

## 2017-02-22 RX ADMIN — AMPICILLIN SODIUM AND SULBACTAM SODIUM 200 GRAM(S): 250; 125 INJECTION, POWDER, FOR SUSPENSION INTRAMUSCULAR; INTRAVENOUS at 06:21

## 2017-02-22 RX ADMIN — Medication 1 TABLET(S): at 12:08

## 2017-02-22 RX ADMIN — Medication 1 TABLET(S): at 06:21

## 2017-02-22 RX ADMIN — HEPARIN SODIUM 5000 UNIT(S): 5000 INJECTION INTRAVENOUS; SUBCUTANEOUS at 22:42

## 2017-02-22 RX ADMIN — HEPARIN SODIUM 5000 UNIT(S): 5000 INJECTION INTRAVENOUS; SUBCUTANEOUS at 06:20

## 2017-02-22 RX ADMIN — MORPHINE SULFATE 2 MILLIGRAM(S): 50 CAPSULE, EXTENDED RELEASE ORAL at 08:40

## 2017-02-22 RX ADMIN — HEPARIN SODIUM 5000 UNIT(S): 5000 INJECTION INTRAVENOUS; SUBCUTANEOUS at 14:00

## 2017-02-22 NOTE — PROGRESS NOTE ADULT - ASSESSMENT
61y Female s/p debridement of chronic draining abdominal wound, now with NPWT dressing  ADAT  Pain/nausea control  OR Cx show staph aureus, will continue Unasyn until sensitivities return  Probiotics  SQH/SCDs  Plan for wound closure revision this friday  Bedside vac change today  Encourage OOBA/IS  AM labs

## 2017-02-22 NOTE — PROGRESS NOTE ADULT - ATTENDING COMMENTS
Patient seen and examined.  Agree with above.  Abdomen soft NT ND.  s/p VAC change.  To OR Friday 2/24 for wound revision/closure.

## 2017-02-22 NOTE — PROGRESS NOTE ADULT - SUBJECTIVE AND OBJECTIVE BOX
O/N: Cx show staph aureus, awaiting sensitivities, nausea improved, tolerating diet, heplocked.  2/21: pain controlled. Added Reglan, decreased narcotics.     STATUS POST: Chronic wound debridement and vac placement    POD: 2 O/N: Cx show staph aureus, awaiting sensitivities, nausea improved, tolerating diet, heplocked.  2/21: pain controlled. Added Reglan, decreased narcotics.     STATUS POST: Chronic wound debridement and vac placement    POD: 2      SUBJECTIVE:  Flatus: [ x] YES [ ] NO             Bowel Movement: [x ] YES [ ] NO  Pain (0-10):            Pain Control Adequate: [x ] YES [ ] NO  Nausea: [ ] YES [ x] NO            Vomiting: [ ] YES [x ] NO  Diarrhea: [ ] YES [ x] NO         Constipation: [ ] YES [ x] NO     Chest Pain: [ ] YES [ x] NO    SOB:  [ ] YES [x ] NO    MEDICATIONS  (STANDING):  heparin  Injectable 5000Unit(s) SubCutaneous every 8 hours  ampicillin/sulbactam  IVPB 3Gram(s) IV Intermittent every 6 hours  metoclopramide Injectable 10milliGRAM(s) IV Push once  lactobacillus acidophilus 1Tablet(s) Oral three times a day with meals    MEDICATIONS  (PRN):  ondansetron Injectable 4milliGRAM(s) IV Push every 6 hours PRN Nausea  acetaminophen   Tablet. 650milliGRAM(s) Oral every 6 hours PRN Moderate Pain (4 - 6)  oxyCODONE  5 mG/acetaminophen 325 mG 1Tablet(s) Oral every 4 hours PRN Severe Pain (7 - 10)      Vital Signs Last 24 Hrs  T(C): 36.4, Max: 37.2 (02-21 @ 16:46)  T(F): 97.6, Max: 99 (02-21 @ 16:46)  HR: 69 (66 - 80)  BP: 126/83 (126/83 - 152/69)  BP(mean): --  RR: 17 (16 - 18)  SpO2: 98% (97% - 99%)    PHYSICAL EXAM:      Constitutional: A&Ox3    Respiratory: non labored breathing, no respiratory distress    Cardiovascular: NSR, RRR    Gastrointestinal: soft, non distended, no TTP                 Incision: open midline incision- wound dressing in place    Genitourinary: voiding     Extremities: (-) edema          I&O's Detail  I & Os for 24h ending 22 Feb 2017 07:00  =============================================  IN:    Total IN: 0 ml  ---------------------------------------------  OUT:    Voided: 1050 ml    VAC (Vacuum Assisted Closure) System: 50 ml    Total OUT: 1100 ml  ---------------------------------------------  Total NET: -1100 ml    I & Os for current day (as of 22 Feb 2017 08:03)  =============================================  IN:    Total IN: 0 ml  ---------------------------------------------  OUT:    VAC (Vacuum Assisted Closure) System: 50 ml    Total OUT: 50 ml  ---------------------------------------------  Total NET: -50 ml      LABS:                        11.4   6.4   )-----------( 231      ( 22 Feb 2017 07:25 )             36.5     22 Feb 2017 07:25    138    |  104    |  14     ----------------------------<  90     3.6     |  27     |  0.64     Ca    9.0        22 Feb 2017 07:25  Phos  2.9       22 Feb 2017 07:25  Mg     2.2       22 Feb 2017 07:25            RADIOLOGY & ADDITIONAL STUDIES:

## 2017-02-22 NOTE — PROGRESS NOTE ADULT - SUBJECTIVE AND OBJECTIVE BOX
pt w/o and c/o  AF/VSS  Abd wound 13 x 8 cm open wound (+) gran tissue (-) signs infection  A/P1. cont abx per wound cx  2. VAC changed bedside  3. DVT ppx  4. tentative plan to Or Fri for closure over drains

## 2017-02-23 LAB
ANION GAP SERPL CALC-SCNC: 7 MMOL/L — LOW (ref 9–16)
APTT BLD: 29.6 SEC — SIGNIFICANT CHANGE UP (ref 27.5–37.4)
BLD GP AB SCN SERPL QL: NEGATIVE — SIGNIFICANT CHANGE UP
BUN SERPL-MCNC: 15 MG/DL — SIGNIFICANT CHANGE UP (ref 7–23)
CALCIUM SERPL-MCNC: 8.8 MG/DL — SIGNIFICANT CHANGE UP (ref 8.5–10.5)
CHLORIDE SERPL-SCNC: 105 MMOL/L — SIGNIFICANT CHANGE UP (ref 96–108)
CO2 SERPL-SCNC: 28 MMOL/L — SIGNIFICANT CHANGE UP (ref 22–31)
CREAT SERPL-MCNC: 0.72 MG/DL — SIGNIFICANT CHANGE UP (ref 0.5–1.3)
GLUCOSE SERPL-MCNC: 89 MG/DL — SIGNIFICANT CHANGE UP (ref 70–99)
HCT VFR BLD CALC: 38.6 % — SIGNIFICANT CHANGE UP (ref 34.5–45)
HGB BLD-MCNC: 11.9 G/DL — SIGNIFICANT CHANGE UP (ref 11.5–15.5)
INR BLD: 0.99 — SIGNIFICANT CHANGE UP (ref 0.88–1.16)
MAGNESIUM SERPL-MCNC: 2.1 MG/DL — SIGNIFICANT CHANGE UP (ref 1.6–2.4)
MCHC RBC-ENTMCNC: 23.8 PG — LOW (ref 27–34)
MCHC RBC-ENTMCNC: 30.8 G/DL — LOW (ref 32–36)
MCV RBC AUTO: 77.2 FL — LOW (ref 80–100)
PHOSPHATE SERPL-MCNC: 4.7 MG/DL — HIGH (ref 2.5–4.5)
PLATELET # BLD AUTO: 284 K/UL — SIGNIFICANT CHANGE UP (ref 150–400)
POTASSIUM SERPL-MCNC: 4.1 MMOL/L — SIGNIFICANT CHANGE UP (ref 3.5–5.3)
POTASSIUM SERPL-SCNC: 4.1 MMOL/L — SIGNIFICANT CHANGE UP (ref 3.5–5.3)
PROTHROM AB SERPL-ACNC: 11 SEC — SIGNIFICANT CHANGE UP (ref 10–13.1)
RBC # BLD: 5 M/UL — SIGNIFICANT CHANGE UP (ref 3.8–5.2)
RBC # FLD: 15.9 % — SIGNIFICANT CHANGE UP (ref 10.3–16.9)
RH IG SCN BLD-IMP: POSITIVE — SIGNIFICANT CHANGE UP
SODIUM SERPL-SCNC: 140 MMOL/L — SIGNIFICANT CHANGE UP (ref 135–145)
WBC # BLD: 5.5 K/UL — SIGNIFICANT CHANGE UP (ref 3.8–10.5)
WBC # FLD AUTO: 5.5 K/UL — SIGNIFICANT CHANGE UP (ref 3.8–10.5)

## 2017-02-23 RX ORDER — DOCUSATE SODIUM 100 MG
100 CAPSULE ORAL ONCE
Qty: 0 | Refills: 0 | Status: COMPLETED | OUTPATIENT
Start: 2017-02-23 | End: 2017-02-23

## 2017-02-23 RX ORDER — SODIUM CHLORIDE 9 MG/ML
1000 INJECTION, SOLUTION INTRAVENOUS
Qty: 0 | Refills: 0 | Status: DISCONTINUED | OUTPATIENT
Start: 2017-02-24 | End: 2017-02-24

## 2017-02-23 RX ORDER — SENNA PLUS 8.6 MG/1
2 TABLET ORAL ONCE
Qty: 0 | Refills: 0 | Status: COMPLETED | OUTPATIENT
Start: 2017-02-23 | End: 2017-02-23

## 2017-02-23 RX ADMIN — HEPARIN SODIUM 5000 UNIT(S): 5000 INJECTION INTRAVENOUS; SUBCUTANEOUS at 21:57

## 2017-02-23 RX ADMIN — HEPARIN SODIUM 5000 UNIT(S): 5000 INJECTION INTRAVENOUS; SUBCUTANEOUS at 06:58

## 2017-02-23 RX ADMIN — Medication 1 TABLET(S): at 11:09

## 2017-02-23 RX ADMIN — Medication 1 TABLET(S): at 17:30

## 2017-02-23 RX ADMIN — Medication 1 TABLET(S): at 06:58

## 2017-02-23 RX ADMIN — SENNA PLUS 2 TABLET(S): 8.6 TABLET ORAL at 19:29

## 2017-02-23 RX ADMIN — HEPARIN SODIUM 5000 UNIT(S): 5000 INJECTION INTRAVENOUS; SUBCUTANEOUS at 15:37

## 2017-02-23 NOTE — PROGRESS NOTE ADULT - ASSESSMENT
61y Female s/p debridement of chronic draining abdominal wound, now with NPWT dressing  Reg diet  Pain/nausea control  Bactrim for staph aureus   Probiotics  SQH/SCDs  Plan for wound closure revision this tomorrow  Encourage IDALIA/IS  AM labs

## 2017-02-23 NOTE — PROGRESS NOTE ADULT - ATTENDING COMMENTS
Patient seen and examined at bedside.  Agree with above.  Abdomen soft NT ND.  VAC in place.  To OR Friday 2/24 for VAC removal, wound closure/revision.

## 2017-02-23 NOTE — PROGRESS NOTE ADULT - SUBJECTIVE AND OBJECTIVE BOX
PRE OPERATIVE NOTE    Pre-op Diagnosis: incisional granulating ulcer  Procedure: wound closure  Surgeon: Dr. Morelos/Dr. Mena    Consent in chart                          11.9   5.5   )-----------( 284      ( 2017 06:29 )             38.6     2017 06:32    140    |  105    |  15     ----------------------------<  89     4.1     |  28     |  0.72     Ca    8.8        2017 06:32  Phos  4.7       2017 06:32  Mg     2.1       2017 06:32      PT/INR - ( 2017 06:30 )   PT: 11.0 sec;   INR: 0.99          PTT - ( 2017 06:30 )  PTT:29.6 sec  Urinalysis Basic - ( 2017 09:00 )    Color: Yellow / Appearance: Clear / S.025 / pH: x  Gluc: x / Ketone: NEGATIVE  / Bili: NEGATIVE / Urobili: 0.2 E.U./dL   Blood: x / Protein: NEGATIVE mg/dL / Nitrite: NEGATIVE   Leuk Esterase: NEGATIVE / RBC: < 5 /HPF / WBC 5-10 /HPF   Sq Epi: x / Non Sq Epi: Moderate /HPF / Bacteria: Present /HPF        Type & Screen: ABO Interpretation: A ( @ 05:30)    CXR: WNL  EKG: NSR        A/P: 61y Female planned for above procedure  NPO past midnight, except medications    trimethoprim  160 mG/sulfamethoxazole 800 mG          AM labs

## 2017-02-23 NOTE — PROGRESS NOTE ADULT - SUBJECTIVE AND OBJECTIVE BOX
O/N: Tolerating diet, pain well controlled, INGRID  2/22: vac changed today- plan for OR closure on Friday; culture- staph aureus- switched to bactrim    STATUS POST: Chronic wound debridement and vac placement    POD: 3 O/N: Tolerating diet, pain well controlled, INGRID  : vac changed today- plan for OR closure on Friday; culture- staph aureus- switched to bactrim    STATUS POST: Chronic wound debridement and vac placement    POD: 3      SUBJECTIVE:  Flatus: [ x] YES [ ] NO             Bowel Movement: [ x] YES [ ] NO  Pain (0-10):            Pain Control Adequate: [ x] YES [ ] NO  Nausea: [ ] YES [x ] NO            Vomiting: [ ] YES [ x] NO  Diarrhea: [ ] YES [x ] NO         Constipation: [ ] YES [ x] NO     Chest Pain: [ ] YES [x ] NO    SOB:  [ ] YES [x ] NO    MEDICATIONS  (STANDING):  heparin  Injectable 5000Unit(s) SubCutaneous every 8 hours  metoclopramide Injectable 10milliGRAM(s) IV Push once  lactobacillus acidophilus 1Tablet(s) Oral three times a day with meals  trimethoprim  160 mG/sulfamethoxazole 800 mG 1Tablet(s) Oral two times a day    MEDICATIONS  (PRN):  ondansetron Injectable 4milliGRAM(s) IV Push every 6 hours PRN Nausea  acetaminophen   Tablet. 650milliGRAM(s) Oral every 6 hours PRN Mild Pain (1 - 3)  oxyCODONE  5 mG/acetaminophen 325 mG 1Tablet(s) Oral every 4 hours PRN Moderate Pain (4 - 6)  oxyCODONE  5 mG/acetaminophen 325 mG 2Tablet(s) Oral every 4 hours PRN Severe Pain (7 - 10)      Vital Signs Last 24 Hrs  T(C): 36.2, Max: 36.8 ( @ 20:50)  T(F): 97.2, Max: 98.2 (- @ 20:50)  HR: 66 (54 - 70)  BP: 115/76 (115/76 - 143/94)  BP(mean): --  RR: 17 (14 - 17)  SpO2: 96% (96% - 100%)    PHYSICAL EXAM:      Constitutional: A&Ox3    Respiratory: non labored breathing, no respiratory distress    Cardiovascular: NSR, RRR    Gastrointestinal: soft, non distended. no TTP                 Incision: CDI, vac in place to midline    Genitourinary: voiding     Extremities: (-) edema        I&O's Detail    I & Os for current day (as of 2017 08:13)  =============================================  IN:    Oral Fluid: 840 ml    Total IN: 840 ml  ---------------------------------------------  OUT:    Voided: 1575 ml    VAC (Vacuum Assisted Closure) System: 75 ml    Total OUT: 1650 ml  ---------------------------------------------  Total NET: -810 ml      LABS:                        11.9   5.5   )-----------( 284      ( 2017 06:29 )             38.6     2017 06:32    140    |  105    |  15     ----------------------------<  89     4.1     |  28     |  0.72     Ca    8.8        2017 06:32  Phos  4.7       2017 06:32  Mg     2.1       2017 06:32      PT/INR - ( 2017 06:30 )   PT: 11.0 sec;   INR: 0.99          PTT - ( 2017 06:30 )  PTT:29.6 sec  Urinalysis Basic - ( 2017 09:00 )    Color: Yellow / Appearance: Clear / S.025 / pH: x  Gluc: x / Ketone: NEGATIVE  / Bili: NEGATIVE / Urobili: 0.2 E.U./dL   Blood: x / Protein: NEGATIVE mg/dL / Nitrite: NEGATIVE   Leuk Esterase: NEGATIVE / RBC: < 5 /HPF / WBC 5-10 /HPF   Sq Epi: x / Non Sq Epi: Moderate /HPF / Bacteria: Present /HPF        RADIOLOGY & ADDITIONAL STUDIES:

## 2017-02-24 LAB
ANION GAP SERPL CALC-SCNC: 6 MMOL/L — LOW (ref 9–16)
APPEARANCE UR: CLEAR — SIGNIFICANT CHANGE UP
BILIRUB UR-MCNC: NEGATIVE — SIGNIFICANT CHANGE UP
BUN SERPL-MCNC: 15 MG/DL — SIGNIFICANT CHANGE UP (ref 7–23)
CALCIUM SERPL-MCNC: 9.2 MG/DL — SIGNIFICANT CHANGE UP (ref 8.5–10.5)
CHLORIDE SERPL-SCNC: 103 MMOL/L — SIGNIFICANT CHANGE UP (ref 96–108)
CO2 SERPL-SCNC: 29 MMOL/L — SIGNIFICANT CHANGE UP (ref 22–31)
COLOR SPEC: YELLOW — SIGNIFICANT CHANGE UP
CREAT SERPL-MCNC: 0.74 MG/DL — SIGNIFICANT CHANGE UP (ref 0.5–1.3)
DIFF PNL FLD: NEGATIVE — SIGNIFICANT CHANGE UP
GLUCOSE SERPL-MCNC: 96 MG/DL — SIGNIFICANT CHANGE UP (ref 70–99)
GLUCOSE UR QL: NEGATIVE — SIGNIFICANT CHANGE UP
HCT VFR BLD CALC: 39.1 % — SIGNIFICANT CHANGE UP (ref 34.5–45)
HGB BLD-MCNC: 12.1 G/DL — SIGNIFICANT CHANGE UP (ref 11.5–15.5)
KETONES UR-MCNC: NEGATIVE — SIGNIFICANT CHANGE UP
LEUKOCYTE ESTERASE UR-ACNC: NEGATIVE — SIGNIFICANT CHANGE UP
MAGNESIUM SERPL-MCNC: 2.3 MG/DL — SIGNIFICANT CHANGE UP (ref 1.6–2.4)
MCHC RBC-ENTMCNC: 23.4 PG — LOW (ref 27–34)
MCHC RBC-ENTMCNC: 30.9 G/DL — LOW (ref 32–36)
MCV RBC AUTO: 75.8 FL — LOW (ref 80–100)
NITRITE UR-MCNC: NEGATIVE — SIGNIFICANT CHANGE UP
PH UR: 7 — SIGNIFICANT CHANGE UP (ref 4–8)
PHOSPHATE SERPL-MCNC: 3.4 MG/DL — SIGNIFICANT CHANGE UP (ref 2.5–4.5)
PLATELET # BLD AUTO: 293 K/UL — SIGNIFICANT CHANGE UP (ref 150–400)
POTASSIUM SERPL-MCNC: 3.9 MMOL/L — SIGNIFICANT CHANGE UP (ref 3.5–5.3)
POTASSIUM SERPL-SCNC: 3.9 MMOL/L — SIGNIFICANT CHANGE UP (ref 3.5–5.3)
PROT UR-MCNC: NEGATIVE MG/DL — SIGNIFICANT CHANGE UP
RBC # BLD: 5.16 M/UL — SIGNIFICANT CHANGE UP (ref 3.8–5.2)
RBC # FLD: 16.2 % — SIGNIFICANT CHANGE UP (ref 10.3–16.9)
SODIUM SERPL-SCNC: 138 MMOL/L — SIGNIFICANT CHANGE UP (ref 135–145)
SP GR SPEC: 1.01 — SIGNIFICANT CHANGE UP (ref 1–1.03)
UROBILINOGEN FLD QL: 0.2 E.U./DL — SIGNIFICANT CHANGE UP
WBC # BLD: 4.8 K/UL — SIGNIFICANT CHANGE UP (ref 3.8–10.5)
WBC # FLD AUTO: 4.8 K/UL — SIGNIFICANT CHANGE UP (ref 3.8–10.5)

## 2017-02-24 RX ORDER — SENNA PLUS 8.6 MG/1
2 TABLET ORAL AT BEDTIME
Qty: 0 | Refills: 0 | Status: DISCONTINUED | OUTPATIENT
Start: 2017-02-24 | End: 2017-03-01

## 2017-02-24 RX ORDER — DOCUSATE SODIUM 100 MG
100 CAPSULE ORAL
Qty: 0 | Refills: 0 | Status: DISCONTINUED | OUTPATIENT
Start: 2017-02-24 | End: 2017-02-25

## 2017-02-24 RX ORDER — MORPHINE SULFATE 50 MG/1
4 CAPSULE, EXTENDED RELEASE ORAL ONCE
Qty: 0 | Refills: 0 | Status: DISCONTINUED | OUTPATIENT
Start: 2017-02-24 | End: 2017-02-24

## 2017-02-24 RX ORDER — SODIUM CHLORIDE 9 MG/ML
1000 INJECTION INTRAMUSCULAR; INTRAVENOUS; SUBCUTANEOUS
Qty: 0 | Refills: 0 | Status: DISCONTINUED | OUTPATIENT
Start: 2017-02-25 | End: 2017-02-27

## 2017-02-24 RX ADMIN — SENNA PLUS 2 TABLET(S): 8.6 TABLET ORAL at 20:57

## 2017-02-24 RX ADMIN — ONDANSETRON 4 MILLIGRAM(S): 8 TABLET, FILM COATED ORAL at 15:05

## 2017-02-24 RX ADMIN — HEPARIN SODIUM 5000 UNIT(S): 5000 INJECTION INTRAVENOUS; SUBCUTANEOUS at 15:06

## 2017-02-24 RX ADMIN — Medication 1 TABLET(S): at 18:54

## 2017-02-24 RX ADMIN — HEPARIN SODIUM 5000 UNIT(S): 5000 INJECTION INTRAVENOUS; SUBCUTANEOUS at 05:40

## 2017-02-24 RX ADMIN — Medication 1 TABLET(S): at 05:40

## 2017-02-24 RX ADMIN — Medication 1 TABLET(S): at 19:00

## 2017-02-24 RX ADMIN — Medication 1 TABLET(S): at 13:05

## 2017-02-24 RX ADMIN — Medication 100 MILLIGRAM(S): at 20:57

## 2017-02-24 RX ADMIN — HEPARIN SODIUM 5000 UNIT(S): 5000 INJECTION INTRAVENOUS; SUBCUTANEOUS at 22:59

## 2017-02-24 RX ADMIN — MORPHINE SULFATE 4 MILLIGRAM(S): 50 CAPSULE, EXTENDED RELEASE ORAL at 10:37

## 2017-02-24 NOTE — PROGRESS NOTE ADULT - ASSESSMENT
61y Female s/p debridement of chronic draining abdominal wound, now with NPWT dressing  NPO/IVF for OR today for wound closure  Pain/nausea control  Bactrim for staph aureus   Probiotics  SQH/SCDs  Encourage OOBA/IS  AM labs 61y Female s/p debridement of chronic draining abdominal wound, now with NPWT dressing  Pain/nausea control  Bactrim for staph aureus   Probiotics  SQH/SCDs  Encourage OOBA/IS  AM labs

## 2017-02-24 NOTE — DIETITIAN INITIAL EVALUATION ADULT. - OTHER INFO
Pt admitted for wound debridement and wound vac placement 2/2 wound that was not closing. Tolerating diet with good P intake. No n/v/d/c. No pain. Pt reports to have gained weight recently due to increased PO intake over the holidays. Is currently trying to lose weight (joined gym, decreasing sugar intake).

## 2017-02-24 NOTE — PROGRESS NOTE ADULT - SUBJECTIVE AND OBJECTIVE BOX
O/N: vac functioning, INGRID  2/22: NPO @ MN, pre-op for OR in AM    STATUS POST: Chronic wound debridement and vac placement    POD: 4 O/N: vac functioning, INGRID  : NPO @ MN, pre-op for OR in AM    STATUS POST: Chronic wound debridement and vac placement    POD: 4      SUBJECTIVE:  Flatus: [x ] YES [ ] NO             Bowel Movement: [ ] YES [ x] NO  Pain (0-10):            Pain Control Adequate: [ x] YES [ ] NO  Nausea: [ ] YES [x ] NO            Vomiting: [ ] YES [x ] NO  Diarrhea: [ ] YES [x ] NO         Constipation: [ ] YES [ x] NO     Chest Pain: [ ] YES [x ] NO    SOB:  [ ] YES [x ] NO    MEDICATIONS  (STANDING):  heparin  Injectable 5000Unit(s) SubCutaneous every 8 hours  metoclopramide Injectable 10milliGRAM(s) IV Push once  lactobacillus acidophilus 1Tablet(s) Oral three times a day with meals  trimethoprim  160 mG/sulfamethoxazole 800 mG 1Tablet(s) Oral two times a day  lactated ringers. 1000milliLiter(s) IV Continuous <Continuous>  docusate sodium 100milliGRAM(s) Oral once    MEDICATIONS  (PRN):  ondansetron Injectable 4milliGRAM(s) IV Push every 6 hours PRN Nausea  acetaminophen   Tablet. 650milliGRAM(s) Oral every 6 hours PRN Mild Pain (1 - 3)  oxyCODONE  5 mG/acetaminophen 325 mG 1Tablet(s) Oral every 4 hours PRN Moderate Pain (4 - 6)  oxyCODONE  5 mG/acetaminophen 325 mG 2Tablet(s) Oral every 4 hours PRN Severe Pain (7 - 10)      Vital Signs Last 24 Hrs  T(C): 36.2, Max: 37.3 ( @ 21:21)  T(F): 97.2, Max: 99.2 ( @ 21:21)  HR: 72 (68 - 84)  BP: 122/79 (113/76 - 136/85)  BP(mean): --  RR: 16 (15 - 18)  SpO2: 98% (96% - 99%)    PHYSICAL EXAM:      Constitutional: A&Ox3    Respiratory: non labored breathing, no respiratory distress    Cardiovascular: NSR, RRR    Gastrointestinal: soft, non distended, no TTP                 Incision: CDI, vac in place    Genitourinary: voiding     Extremities: (-) edema          I&O's Detail    I & Os for current day (as of 2017 08:12)  =============================================  IN:    lactated ringers.: 910 ml    Oral Fluid: 800 ml    Total IN: 1710 ml  ---------------------------------------------  OUT:    Voided: 1000 ml    VAC (Vacuum Assisted Closure) System: 25 ml    Total OUT: 1025 ml  ---------------------------------------------  Total NET: 685 ml      LABS:                        11.9   5.5   )-----------( 284      ( 2017 06:29 )             38.6     2017 06:32    140    |  105    |  15     ----------------------------<  89     4.1     |  28     |  0.72     Ca    8.8        2017 06:32  Phos  4.7       2017 06:32  Mg     2.1       2017 06:32      PT/INR - ( 2017 06:30 )   PT: 11.0 sec;   INR: 0.99          PTT - ( 2017 06:30 )  PTT:29.6 sec  Urinalysis Basic - ( 2017 06:46 )    Color: Yellow / Appearance: Clear / S.015 / pH: x  Gluc: x / Ketone: NEGATIVE  / Bili: NEGATIVE / Urobili: 0.2 E.U./dL   Blood: x / Protein: NEGATIVE mg/dL / Nitrite: NEGATIVE   Leuk Esterase: NEGATIVE / RBC: x / WBC x   Sq Epi: x / Non Sq Epi: x / Bacteria: x        RADIOLOGY & ADDITIONAL STUDIES:

## 2017-02-24 NOTE — DIETITIAN INITIAL EVALUATION ADULT. - ENERGY NEEDS
Height: 71" Weight: 218lbs, IBW 155lbs+/-10%, %%, BMI - 30  IBW used to calculate energy needs due to pt's current body weight exceeding 120% of IBW   Nutrient needs based on [facility] standards of care for maintenance in adults; protein adjusted 2/2 wound

## 2017-02-24 NOTE — PROGRESS NOTE ADULT - ATTENDING COMMENTS
Patient seen and examined at bedside.  Agree with above.  s/p bedside VAC change today.  To OR Saturday with Dr. Mena for wound revision/closure.

## 2017-02-25 LAB
ANION GAP SERPL CALC-SCNC: 7 MMOL/L — LOW (ref 9–16)
BUN SERPL-MCNC: 14 MG/DL — SIGNIFICANT CHANGE UP (ref 7–23)
CALCIUM SERPL-MCNC: 9.5 MG/DL — SIGNIFICANT CHANGE UP (ref 8.5–10.5)
CHLORIDE SERPL-SCNC: 102 MMOL/L — SIGNIFICANT CHANGE UP (ref 96–108)
CO2 SERPL-SCNC: 29 MMOL/L — SIGNIFICANT CHANGE UP (ref 22–31)
CREAT SERPL-MCNC: 0.92 MG/DL — SIGNIFICANT CHANGE UP (ref 0.5–1.3)
GLUCOSE SERPL-MCNC: 102 MG/DL — HIGH (ref 70–99)
HCT VFR BLD CALC: 41.6 % — SIGNIFICANT CHANGE UP (ref 34.5–45)
HGB BLD-MCNC: 12.9 G/DL — SIGNIFICANT CHANGE UP (ref 11.5–15.5)
MAGNESIUM SERPL-MCNC: 2.4 MG/DL — SIGNIFICANT CHANGE UP (ref 1.6–2.4)
MCHC RBC-ENTMCNC: 23.5 PG — LOW (ref 27–34)
MCHC RBC-ENTMCNC: 31 G/DL — LOW (ref 32–36)
MCV RBC AUTO: 75.6 FL — LOW (ref 80–100)
PHOSPHATE SERPL-MCNC: 4.5 MG/DL — SIGNIFICANT CHANGE UP (ref 2.5–4.5)
PLATELET # BLD AUTO: 311 K/UL — SIGNIFICANT CHANGE UP (ref 150–400)
POTASSIUM SERPL-MCNC: 4 MMOL/L — SIGNIFICANT CHANGE UP (ref 3.5–5.3)
POTASSIUM SERPL-SCNC: 4 MMOL/L — SIGNIFICANT CHANGE UP (ref 3.5–5.3)
RBC # BLD: 5.5 M/UL — HIGH (ref 3.8–5.2)
RBC # FLD: 16.1 % — SIGNIFICANT CHANGE UP (ref 10.3–16.9)
SODIUM SERPL-SCNC: 138 MMOL/L — SIGNIFICANT CHANGE UP (ref 135–145)
WBC # BLD: 5.4 K/UL — SIGNIFICANT CHANGE UP (ref 3.8–10.5)
WBC # FLD AUTO: 5.4 K/UL — SIGNIFICANT CHANGE UP (ref 3.8–10.5)

## 2017-02-25 RX ORDER — PANTOPRAZOLE SODIUM 20 MG/1
40 TABLET, DELAYED RELEASE ORAL ONCE
Qty: 0 | Refills: 0 | Status: DISCONTINUED | OUTPATIENT
Start: 2017-02-25 | End: 2017-02-25

## 2017-02-25 RX ORDER — PANTOPRAZOLE SODIUM 20 MG/1
40 TABLET, DELAYED RELEASE ORAL ONCE
Qty: 0 | Refills: 0 | Status: COMPLETED | OUTPATIENT
Start: 2017-02-25 | End: 2017-02-25

## 2017-02-25 RX ORDER — DOCUSATE SODIUM 100 MG
100 CAPSULE ORAL THREE TIMES A DAY
Qty: 0 | Refills: 0 | Status: DISCONTINUED | OUTPATIENT
Start: 2017-02-25 | End: 2017-03-01

## 2017-02-25 RX ORDER — POLYETHYLENE GLYCOL 3350 17 G/17G
17 POWDER, FOR SOLUTION ORAL ONCE
Qty: 0 | Refills: 0 | Status: COMPLETED | OUTPATIENT
Start: 2017-02-25 | End: 2017-02-25

## 2017-02-25 RX ADMIN — Medication 100 MILLIGRAM(S): at 21:19

## 2017-02-25 RX ADMIN — Medication 1 TABLET(S): at 18:02

## 2017-02-25 RX ADMIN — Medication 100 MILLIGRAM(S): at 13:43

## 2017-02-25 RX ADMIN — HEPARIN SODIUM 5000 UNIT(S): 5000 INJECTION INTRAVENOUS; SUBCUTANEOUS at 21:19

## 2017-02-25 RX ADMIN — PANTOPRAZOLE SODIUM 40 MILLIGRAM(S): 20 TABLET, DELAYED RELEASE ORAL at 22:52

## 2017-02-25 RX ADMIN — SENNA PLUS 2 TABLET(S): 8.6 TABLET ORAL at 21:20

## 2017-02-25 RX ADMIN — HEPARIN SODIUM 5000 UNIT(S): 5000 INJECTION INTRAVENOUS; SUBCUTANEOUS at 13:43

## 2017-02-25 RX ADMIN — POLYETHYLENE GLYCOL 3350 17 GRAM(S): 17 POWDER, FOR SOLUTION ORAL at 18:02

## 2017-02-25 RX ADMIN — Medication 1 TABLET(S): at 13:43

## 2017-02-25 RX ADMIN — HEPARIN SODIUM 5000 UNIT(S): 5000 INJECTION INTRAVENOUS; SUBCUTANEOUS at 07:33

## 2017-02-25 RX ADMIN — Medication 1 TABLET(S): at 07:32

## 2017-02-25 NOTE — PROGRESS NOTE ADULT - SUBJECTIVE AND OBJECTIVE BOX
O/N: NPO for OR, INGRID  2/23: vac chnaged; plan for OR elin- NPo @ [facility]    STATUS POST: Chronic wound debridement and vac placement    POD: 5 O/N: NPO for OR, INGRID  : vac changed; plan for OR elin- NPO@ MN    STATUS POST: Chronic wound debridement and vac placement    POD: 5    SUBJECTIVE: No complaints. NPO for OR    Pain: [ ] YES [X ] NO  Flatus: [ X] YES [ ] NO             Bowel Movement: [X ] YES [ ] NO  Nausea: [ ] YES [X ] NO            Vomiting: [ ] YES [ X] NO  Diarrhea: [ ] YES [X ] NO         Chest Pain: [ ] YES [X ] NO    SOB:  [ ] YES [X ] NO    MEDICATIONS  (STANDING):  heparin  Injectable 5000Unit(s) SubCutaneous every 8 hours  metoclopramide Injectable 10milliGRAM(s) IV Push once  lactobacillus acidophilus 1Tablet(s) Oral three times a day with meals  trimethoprim  160 mG/sulfamethoxazole 800 mG 1Tablet(s) Oral two times a day  docusate sodium 100milliGRAM(s) Oral once  sodium chloride 0.9%. 1000milliLiter(s) IV Continuous <Continuous>  senna 2Tablet(s) Oral at bedtime  docusate sodium 100milliGRAM(s) Oral two times a day    MEDICATIONS  (PRN):  ondansetron Injectable 4milliGRAM(s) IV Push every 6 hours PRN Nausea  acetaminophen   Tablet. 650milliGRAM(s) Oral every 6 hours PRN Mild Pain (1 - 3)  oxyCODONE  5 mG/acetaminophen 325 mG 1Tablet(s) Oral every 4 hours PRN Moderate Pain (4 - 6)  oxyCODONE  5 mG/acetaminophen 325 mG 2Tablet(s) Oral every 4 hours PRN Severe Pain (7 - 10)      Vital Signs Last 24 Hrs  T(C): 36.7, Max: 37 (02-24 @ 17:44)  T(F): 98, Max: 98.6 (02-24 @ 17:44)  HR: 73 (64 - 76)  BP: 119/76 (114/77 - 126/88)  BP(mean): --  RR: 17 (17 - 96)  SpO2: 98% (95% - 99%)    PHYSICAL EXAM:      Constitutional: A&Ox3, nad    Respiratory: non labored breathing, no respiratory distress    Cardiovascular: NSR, RRR    Gastrointestinal: oft, non distended, no TTP                 Incision: CDI, vac in place    Genitourinary: voids    Extremities: (-) edema                  I&O's Detail    I & Os for current day (as of 2017 08:16)  =============================================  IN:    Oral Fluid: 1860 ml    sodium chloride 0.9%.: 980 ml    Total IN: 2840 ml  ---------------------------------------------  OUT:    Voided: 2000 ml    Total OUT: 2000 ml  ---------------------------------------------  Total NET: 840 ml      LABS:                        12.9   5.4   )-----------( 311      ( 2017 07:46 )             41.6     2017 07:46    138    |  102    |  14     ----------------------------<  102    4.0     |  29     |  0.92     Ca    9.5        2017 07:46  Phos  4.5       2017 07:46  Mg     2.4       2017 07:46        Urinalysis Basic - ( 2017 06:46 )    Color: Yellow / Appearance: Clear / S.015 / pH: x  Gluc: x / Ketone: NEGATIVE  / Bili: NEGATIVE / Urobili: 0.2 E.U./dL   Blood: x / Protein: NEGATIVE mg/dL / Nitrite: NEGATIVE   Leuk Esterase: NEGATIVE / RBC: x / WBC x   Sq Epi: x / Non Sq Epi: x / Bacteria: x        RADIOLOGY & ADDITIONAL STUDIES:

## 2017-02-25 NOTE — PROGRESS NOTE ADULT - ATTENDING COMMENTS
OR Monday with Rajesh for wound revision/closure.  If unable to be done Monday, likely discharge home with home VAC or wet-to-dry.

## 2017-02-25 NOTE — PROGRESS NOTE ADULT - ASSESSMENT
61y Female s/p debridement of chronic draining abdominal wound, now with NPWT dressing  Pain/nausea control  Bactrim for staph aureus   Probiotics  SQH/SCDs  Encourage OOBA/IS  AM labs  OR today for possible closure 61y Female s/p debridement of chronic draining abdominal wound, now with NPWT dressing  Pain/nausea control  Bactrim for staph aureus   Probiotics  SQH/SCDs  Encourage OOBA/IS  AM labs  OR Sunday for possible closure

## 2017-02-26 LAB
ANION GAP SERPL CALC-SCNC: 9 MMOL/L — SIGNIFICANT CHANGE UP (ref 9–16)
BUN SERPL-MCNC: 18 MG/DL — SIGNIFICANT CHANGE UP (ref 7–23)
CALCIUM SERPL-MCNC: 9.3 MG/DL — SIGNIFICANT CHANGE UP (ref 8.5–10.5)
CHLORIDE SERPL-SCNC: 102 MMOL/L — SIGNIFICANT CHANGE UP (ref 96–108)
CO2 SERPL-SCNC: 27 MMOL/L — SIGNIFICANT CHANGE UP (ref 22–31)
CREAT SERPL-MCNC: 0.82 MG/DL — SIGNIFICANT CHANGE UP (ref 0.5–1.3)
GLUCOSE SERPL-MCNC: 95 MG/DL — SIGNIFICANT CHANGE UP (ref 70–99)
HCT VFR BLD CALC: 38.9 % — SIGNIFICANT CHANGE UP (ref 34.5–45)
HGB BLD-MCNC: 12.4 G/DL — SIGNIFICANT CHANGE UP (ref 11.5–15.5)
MAGNESIUM SERPL-MCNC: 2.2 MG/DL — SIGNIFICANT CHANGE UP (ref 1.6–2.4)
MCHC RBC-ENTMCNC: 24.3 PG — LOW (ref 27–34)
MCHC RBC-ENTMCNC: 31.9 G/DL — LOW (ref 32–36)
MCV RBC AUTO: 76.1 FL — LOW (ref 80–100)
PHOSPHATE SERPL-MCNC: 3.5 MG/DL — SIGNIFICANT CHANGE UP (ref 2.5–4.5)
PLATELET # BLD AUTO: 312 K/UL — SIGNIFICANT CHANGE UP (ref 150–400)
POTASSIUM SERPL-MCNC: 4.1 MMOL/L — SIGNIFICANT CHANGE UP (ref 3.5–5.3)
POTASSIUM SERPL-SCNC: 4.1 MMOL/L — SIGNIFICANT CHANGE UP (ref 3.5–5.3)
RBC # BLD: 5.11 M/UL — SIGNIFICANT CHANGE UP (ref 3.8–5.2)
RBC # FLD: 15.9 % — SIGNIFICANT CHANGE UP (ref 10.3–16.9)
SODIUM SERPL-SCNC: 138 MMOL/L — SIGNIFICANT CHANGE UP (ref 135–145)
WBC # BLD: 5.3 K/UL — SIGNIFICANT CHANGE UP (ref 3.8–10.5)
WBC # FLD AUTO: 5.3 K/UL — SIGNIFICANT CHANGE UP (ref 3.8–10.5)

## 2017-02-26 RX ADMIN — Medication 1 TABLET(S): at 18:29

## 2017-02-26 RX ADMIN — HEPARIN SODIUM 5000 UNIT(S): 5000 INJECTION INTRAVENOUS; SUBCUTANEOUS at 07:05

## 2017-02-26 RX ADMIN — Medication 1 TABLET(S): at 12:45

## 2017-02-26 RX ADMIN — Medication 1 TABLET(S): at 07:05

## 2017-02-26 RX ADMIN — HEPARIN SODIUM 5000 UNIT(S): 5000 INJECTION INTRAVENOUS; SUBCUTANEOUS at 22:09

## 2017-02-26 RX ADMIN — Medication 100 MILLIGRAM(S): at 15:02

## 2017-02-26 RX ADMIN — Medication 100 MILLIGRAM(S): at 22:08

## 2017-02-26 RX ADMIN — SENNA PLUS 2 TABLET(S): 8.6 TABLET ORAL at 22:08

## 2017-02-26 RX ADMIN — Medication 1 TABLET(S): at 18:12

## 2017-02-26 RX ADMIN — HEPARIN SODIUM 5000 UNIT(S): 5000 INJECTION INTRAVENOUS; SUBCUTANEOUS at 14:02

## 2017-02-26 RX ADMIN — Medication 100 MILLIGRAM(S): at 07:04

## 2017-02-26 NOTE — PROGRESS NOTE ADULT - ATTENDING COMMENTS
Patient seen and examined at bedside.  Agree with above.  Abdomen soft, NT ND.  VAC in place.    Bedside VAC change tomorrow, possible OR Tuesday with Dr. Mena for wound closure.

## 2017-02-26 NOTE — PROGRESS NOTE ADULT - SUBJECTIVE AND OBJECTIVE BOX
24hr Events:  O/N:INGRID, VSS  2/25: OR closure Mon. Tolerating PO. VSS    Assessment/Plan:  61y Female s/p debridement of chronic draining abdominal wound, now with NPWT dressing  Pain/nausea control  Bactrim for staph aureus   Probiotics  SQH/SCDs  Encourage OOBA/IS  AM labs  OR Monday for possible closure 24hr Events:  O/N:INGRID, VSS  2/25: OR closure Mon. Tolerating PO. VSS    SUBJECTIVE: Pt seen and examined at bedside. No overnight events.  Pain controlled. No f/c/n/v.     Vital Signs Last 24 Hrs  T(C): 36.2, Max: 37.2 (02-25 @ 15:27)  T(F): 97.2, Max: 98.9 (02-25 @ 15:27)  HR: 68 (68 - 82)  BP: 122/77 (113/80 - 127/74)  BP(mean): --  RR: 16 (16 - 17)  SpO2: 97% (95% - 100%)    PHYSICAL EXAM    Gen: NAD  Pulm: no respiratory distress  Abd: Soft, NT/ND, midline wound vac in place  Ext: WWP    I&O's Detail    I & Os for current day (as of 26 Feb 2017 07:55)  =============================================  IN:    Total IN: 0 ml  ---------------------------------------------  OUT:    Voided: 400 ml    Total OUT: 400 ml  ---------------------------------------------  Total NET: -400 ml      LABS:                        12.4   5.3   )-----------( 312      ( 26 Feb 2017 07:17 )             38.9     26 Feb 2017 07:17    138    |  102    |  18     ----------------------------<  95     4.1     |  27     |  0.82     Ca    9.3        26 Feb 2017 07:17  Phos  3.5       26 Feb 2017 07:17  Mg     2.2       26 Feb 2017 07:17            MEDICATIONS  (STANDING):  heparin  Injectable 5000Unit(s) SubCutaneous every 8 hours  metoclopramide Injectable 10milliGRAM(s) IV Push once  lactobacillus acidophilus 1Tablet(s) Oral three times a day with meals  trimethoprim  160 mG/sulfamethoxazole 800 mG 1Tablet(s) Oral two times a day  sodium chloride 0.9%. 1000milliLiter(s) IV Continuous <Continuous>  senna 2Tablet(s) Oral at bedtime  docusate sodium 100milliGRAM(s) Oral three times a day    MEDICATIONS  (PRN):  ondansetron Injectable 4milliGRAM(s) IV Push every 6 hours PRN Nausea  acetaminophen   Tablet. 650milliGRAM(s) Oral every 6 hours PRN Mild Pain (1 - 3)  oxyCODONE  5 mG/acetaminophen 325 mG 1Tablet(s) Oral every 4 hours PRN Moderate Pain (4 - 6)  oxyCODONE  5 mG/acetaminophen 325 mG 2Tablet(s) Oral every 4 hours PRN Severe Pain (7 - 10)      RADIOLOGY & ADDITIONAL STUDIES:    ASSESSMENT AND PLAN    Assessment/Plan:  61y Female s/p debridement of chronic draining abdominal wound, now with NPWT dressing  Pain/nausea control  Bactrim for staph aureus   Probiotics  SQH/SCDs  Encourage OOBA/IS  AM labs  OR Monday for possible closure

## 2017-02-27 LAB
ANION GAP SERPL CALC-SCNC: 8 MMOL/L — LOW (ref 9–16)
BUN SERPL-MCNC: 18 MG/DL — SIGNIFICANT CHANGE UP (ref 7–23)
CALCIUM SERPL-MCNC: 9.4 MG/DL — SIGNIFICANT CHANGE UP (ref 8.5–10.5)
CHLORIDE SERPL-SCNC: 103 MMOL/L — SIGNIFICANT CHANGE UP (ref 96–108)
CO2 SERPL-SCNC: 27 MMOL/L — SIGNIFICANT CHANGE UP (ref 22–31)
CREAT SERPL-MCNC: 0.65 MG/DL — SIGNIFICANT CHANGE UP (ref 0.5–1.3)
GLUCOSE SERPL-MCNC: 112 MG/DL — HIGH (ref 70–99)
HCT VFR BLD CALC: 39.5 % — SIGNIFICANT CHANGE UP (ref 34.5–45)
HGB BLD-MCNC: 12.6 G/DL — SIGNIFICANT CHANGE UP (ref 11.5–15.5)
MAGNESIUM SERPL-MCNC: 2.3 MG/DL — SIGNIFICANT CHANGE UP (ref 1.6–2.4)
MCHC RBC-ENTMCNC: 24.4 PG — LOW (ref 27–34)
MCHC RBC-ENTMCNC: 31.9 G/DL — LOW (ref 32–36)
MCV RBC AUTO: 76.4 FL — LOW (ref 80–100)
PHOSPHATE SERPL-MCNC: 3.4 MG/DL — SIGNIFICANT CHANGE UP (ref 2.5–4.5)
PLATELET # BLD AUTO: 298 K/UL — SIGNIFICANT CHANGE UP (ref 150–400)
POTASSIUM SERPL-MCNC: 4.1 MMOL/L — SIGNIFICANT CHANGE UP (ref 3.5–5.3)
POTASSIUM SERPL-SCNC: 4.1 MMOL/L — SIGNIFICANT CHANGE UP (ref 3.5–5.3)
RBC # BLD: 5.17 M/UL — SIGNIFICANT CHANGE UP (ref 3.8–5.2)
RBC # FLD: 15.9 % — SIGNIFICANT CHANGE UP (ref 10.3–16.9)
SODIUM SERPL-SCNC: 138 MMOL/L — SIGNIFICANT CHANGE UP (ref 135–145)
WBC # BLD: 6 K/UL — SIGNIFICANT CHANGE UP (ref 3.8–10.5)
WBC # FLD AUTO: 6 K/UL — SIGNIFICANT CHANGE UP (ref 3.8–10.5)

## 2017-02-27 RX ORDER — MORPHINE SULFATE 50 MG/1
4 CAPSULE, EXTENDED RELEASE ORAL ONCE
Qty: 0 | Refills: 0 | Status: DISCONTINUED | OUTPATIENT
Start: 2017-02-27 | End: 2017-02-27

## 2017-02-27 RX ADMIN — Medication 100 MILLIGRAM(S): at 15:09

## 2017-02-27 RX ADMIN — HEPARIN SODIUM 5000 UNIT(S): 5000 INJECTION INTRAVENOUS; SUBCUTANEOUS at 07:22

## 2017-02-27 RX ADMIN — Medication 1 TABLET(S): at 12:48

## 2017-02-27 RX ADMIN — Medication 100 MILLIGRAM(S): at 07:21

## 2017-02-27 RX ADMIN — Medication 100 MILLIGRAM(S): at 22:10

## 2017-02-27 RX ADMIN — SENNA PLUS 2 TABLET(S): 8.6 TABLET ORAL at 22:10

## 2017-02-27 RX ADMIN — HEPARIN SODIUM 5000 UNIT(S): 5000 INJECTION INTRAVENOUS; SUBCUTANEOUS at 22:10

## 2017-02-27 RX ADMIN — MORPHINE SULFATE 4 MILLIGRAM(S): 50 CAPSULE, EXTENDED RELEASE ORAL at 17:43

## 2017-02-27 RX ADMIN — MORPHINE SULFATE 4 MILLIGRAM(S): 50 CAPSULE, EXTENDED RELEASE ORAL at 17:20

## 2017-02-27 RX ADMIN — Medication 1 TABLET(S): at 07:21

## 2017-02-27 RX ADMIN — Medication 1 TABLET(S): at 18:40

## 2017-02-27 RX ADMIN — ONDANSETRON 4 MILLIGRAM(S): 8 TABLET, FILM COATED ORAL at 19:05

## 2017-02-27 RX ADMIN — Medication 1 TABLET(S): at 17:19

## 2017-02-27 RX ADMIN — HEPARIN SODIUM 5000 UNIT(S): 5000 INJECTION INTRAVENOUS; SUBCUTANEOUS at 14:10

## 2017-02-27 NOTE — PROGRESS NOTE ADULT - ATTENDING COMMENTS
Patient seen and examined at bedside.  Agree with above.  Abdomen soft, NT.  VAC in place.  To OR 2/28 with Dr. Mena for wound revision/closure.

## 2017-02-27 NOTE — PROGRESS NOTE ADULT - SUBJECTIVE AND OBJECTIVE BOX
O/N: Vac functioning    STATUS POST: Chronic wound debridement and vac placement    POD: 7 O/N: Vac functioning    STATUS POST: Chronic wound debridement and vac placement    POD: 7        SUBJECTIVE:  Flatus: [ ] YES [ ] NO             Bowel Movement: [ ] YES [ ] NO  Pain (0-10):            Pain Control Adequate: [x ] YES [ ] NO  Nausea: [ ] YES [ x] NO            Vomiting: [ ] YES [x ] NO  Diarrhea: [ ] YES [ ] NO         Constipation: [ ] YES [ ] NO     Chest Pain: [ ] YES [x ] NO    SOB:  [ ] YES [x ] NO    MEDICATIONS  (STANDING):  heparin  Injectable 5000Unit(s) SubCutaneous every 8 hours  metoclopramide Injectable 10milliGRAM(s) IV Push once  lactobacillus acidophilus 1Tablet(s) Oral three times a day with meals  trimethoprim  160 mG/sulfamethoxazole 800 mG 1Tablet(s) Oral two times a day  senna 2Tablet(s) Oral at bedtime  docusate sodium 100milliGRAM(s) Oral three times a day    MEDICATIONS  (PRN):  ondansetron Injectable 4milliGRAM(s) IV Push every 6 hours PRN Nausea  acetaminophen   Tablet. 650milliGRAM(s) Oral every 6 hours PRN Mild Pain (1 - 3)  oxyCODONE  5 mG/acetaminophen 325 mG 1Tablet(s) Oral every 4 hours PRN Moderate Pain (4 - 6)  oxyCODONE  5 mG/acetaminophen 325 mG 2Tablet(s) Oral every 4 hours PRN Severe Pain (7 - 10)      Vital Signs Last 24 Hrs  T(C): 37.1, Max: 37.1 (02-27 @ 08:35)  T(F): 98.7, Max: 98.7 (02-27 @ 08:35)  HR: 73 (65 - 73)  BP: 127/83 (103/70 - 127/83)  BP(mean): --  RR: 17 (16 - 17)  SpO2: 97% (96% - 97%)    Physical Exam:  General: NAD, resting comfortably in bed  Pulmonary: Nonlabored breathing, no respiratory distress  Cardiovascular: NSR  Abdominal: soft, NT/ND, wound granulating, vac in place and functioning  Extremities: WWP, normal strength  Neuro: A/O x 3, CNs II-XII grossly intact, no focal deficits, normal motor/sensation  Pulses: palpable distal pulses    I&O's Summary    I & Os for current day (as of 27 Feb 2017 10:42)  =============================================  IN: 150 ml / OUT: 0 ml / NET: 150 ml      LABS:                        12.6   6.0   )-----------( 298      ( 27 Feb 2017 08:02 )             39.5     27 Feb 2017 08:02    138    |  103    |  18     ----------------------------<  112    4.1     |  27     |  0.65     Ca    9.4        27 Feb 2017 08:02  Phos  3.4       27 Feb 2017 08:02  Mg     2.3       27 Feb 2017 08:02          CAPILLARY BLOOD GLUCOSE        RADIOLOGY & ADDITIONAL STUDIES:

## 2017-02-27 NOTE — PROGRESS NOTE ADULT - ASSESSMENT
61y Female s/p debridement of chronic draining abdominal wound, now with NPWT dressing  Pain/nausea control  Bactrim for staph aureus   Probiotics  SQH/SCDs  Encourage OOBA/IS  AM labs  OR Tues for wound closure  Preop for Tm  NPO past Mn

## 2017-02-28 LAB
ANION GAP SERPL CALC-SCNC: 7 MMOL/L — LOW (ref 9–16)
BUN SERPL-MCNC: 15 MG/DL — SIGNIFICANT CHANGE UP (ref 7–23)
CALCIUM SERPL-MCNC: 9.3 MG/DL — SIGNIFICANT CHANGE UP (ref 8.5–10.5)
CHLORIDE SERPL-SCNC: 100 MMOL/L — SIGNIFICANT CHANGE UP (ref 96–108)
CO2 SERPL-SCNC: 28 MMOL/L — SIGNIFICANT CHANGE UP (ref 22–31)
CREAT SERPL-MCNC: 0.62 MG/DL — SIGNIFICANT CHANGE UP (ref 0.5–1.3)
GLUCOSE SERPL-MCNC: 89 MG/DL — SIGNIFICANT CHANGE UP (ref 70–99)
HCT VFR BLD CALC: 38.2 % — SIGNIFICANT CHANGE UP (ref 34.5–45)
HGB BLD-MCNC: 11.9 G/DL — SIGNIFICANT CHANGE UP (ref 11.5–15.5)
MAGNESIUM SERPL-MCNC: 2.3 MG/DL — SIGNIFICANT CHANGE UP (ref 1.6–2.4)
MCHC RBC-ENTMCNC: 24.1 PG — LOW (ref 27–34)
MCHC RBC-ENTMCNC: 31.2 G/DL — LOW (ref 32–36)
MCV RBC AUTO: 77.3 FL — LOW (ref 80–100)
PHOSPHATE SERPL-MCNC: 3.8 MG/DL — SIGNIFICANT CHANGE UP (ref 2.5–4.5)
PLATELET # BLD AUTO: 311 K/UL — SIGNIFICANT CHANGE UP (ref 150–400)
POTASSIUM SERPL-MCNC: 4.1 MMOL/L — SIGNIFICANT CHANGE UP (ref 3.5–5.3)
POTASSIUM SERPL-SCNC: 4.1 MMOL/L — SIGNIFICANT CHANGE UP (ref 3.5–5.3)
RBC # BLD: 4.94 M/UL — SIGNIFICANT CHANGE UP (ref 3.8–5.2)
RBC # FLD: 16.1 % — SIGNIFICANT CHANGE UP (ref 10.3–16.9)
SODIUM SERPL-SCNC: 135 MMOL/L — SIGNIFICANT CHANGE UP (ref 135–145)
WBC # BLD: 6.2 K/UL — SIGNIFICANT CHANGE UP (ref 3.8–10.5)
WBC # FLD AUTO: 6.2 K/UL — SIGNIFICANT CHANGE UP (ref 3.8–10.5)

## 2017-02-28 PROCEDURE — 93010 ELECTROCARDIOGRAM REPORT: CPT

## 2017-02-28 RX ORDER — SODIUM CHLORIDE 9 MG/ML
1000 INJECTION, SOLUTION INTRAVENOUS
Qty: 0 | Refills: 0 | Status: DISCONTINUED | OUTPATIENT
Start: 2017-02-28 | End: 2017-03-01

## 2017-02-28 RX ORDER — MORPHINE SULFATE 50 MG/1
2 CAPSULE, EXTENDED RELEASE ORAL
Qty: 0 | Refills: 0 | Status: DISCONTINUED | OUTPATIENT
Start: 2017-02-28 | End: 2017-02-28

## 2017-02-28 RX ADMIN — MORPHINE SULFATE 2 MILLIGRAM(S): 50 CAPSULE, EXTENDED RELEASE ORAL at 21:15

## 2017-02-28 RX ADMIN — MORPHINE SULFATE 2 MILLIGRAM(S): 50 CAPSULE, EXTENDED RELEASE ORAL at 19:54

## 2017-02-28 RX ADMIN — MORPHINE SULFATE 2 MILLIGRAM(S): 50 CAPSULE, EXTENDED RELEASE ORAL at 20:15

## 2017-02-28 RX ADMIN — SENNA PLUS 2 TABLET(S): 8.6 TABLET ORAL at 23:25

## 2017-02-28 RX ADMIN — Medication 100 MILLIGRAM(S): at 06:23

## 2017-02-28 RX ADMIN — MORPHINE SULFATE 2 MILLIGRAM(S): 50 CAPSULE, EXTENDED RELEASE ORAL at 21:06

## 2017-02-28 RX ADMIN — SODIUM CHLORIDE 120 MILLILITER(S): 9 INJECTION, SOLUTION INTRAVENOUS at 06:23

## 2017-02-28 RX ADMIN — Medication 100 MILLIGRAM(S): at 23:25

## 2017-02-28 RX ADMIN — HEPARIN SODIUM 5000 UNIT(S): 5000 INJECTION INTRAVENOUS; SUBCUTANEOUS at 06:23

## 2017-02-28 RX ADMIN — Medication 1 TABLET(S): at 06:23

## 2017-02-28 NOTE — PROGRESS NOTE ADULT - SUBJECTIVE AND OBJECTIVE BOX
O/N: vac functioning, OR tm, NPO past mn  2/27: Vac change later today.     STATUS POST: Chronic wound debridement and vac placement    POD: 8 O/N: vac functioning, OR tm, NPO past mn  2/27: Vac change later today.     STATUS POST: Chronic wound debridement and vac placement    POD: 8        SUBJECTIVE:  Flatus: [ ] YES [ ] NO             Bowel Movement: [ ] YES [ ] NO  Pain (0-10):            Pain Control Adequate: [x ] YES [ ] NO  Nausea: [ ] YES [x ] NO            Vomiting: [ ] YES [x ] NO  Diarrhea: [ ] YES [ ] NO         Constipation: [ ] YES [ ] NO     Chest Pain: [ ] YES [x ] NO    SOB:  [ ] YES [x ] NO    MEDICATIONS  (STANDING):  heparin  Injectable 5000Unit(s) SubCutaneous every 8 hours  lactobacillus acidophilus 1Tablet(s) Oral three times a day with meals  trimethoprim  160 mG/sulfamethoxazole 800 mG 1Tablet(s) Oral two times a day  senna 2Tablet(s) Oral at bedtime  docusate sodium 100milliGRAM(s) Oral three times a day  lactated ringers. 1000milliLiter(s) IV Continuous <Continuous>    MEDICATIONS  (PRN):  ondansetron Injectable 4milliGRAM(s) IV Push every 6 hours PRN Nausea  acetaminophen   Tablet. 650milliGRAM(s) Oral every 6 hours PRN Mild Pain (1 - 3)  oxyCODONE  5 mG/acetaminophen 325 mG 1Tablet(s) Oral every 4 hours PRN Moderate Pain (4 - 6)  oxyCODONE  5 mG/acetaminophen 325 mG 2Tablet(s) Oral every 4 hours PRN Severe Pain (7 - 10)      Vital Signs Last 24 Hrs  T(C): 36.2, Max: 37.1 (02-27 @ 08:35)  T(F): 97.2, Max: 98.8 (02-27 @ 14:29)  HR: 71 (71 - 93)  BP: 132/89 (127/83 - 136/89)  BP(mean): --  RR: 16 (16 - 17)  SpO2: 97% (97% - 98%)    Physical Exam:  General: NAD, resting comfortably in bed  Pulmonary: Nonlabored breathing, no respiratory distress  Cardiovascular: NSR  Abdominal: soft, NT/ND  Extremities: WWP, normal strength  Neuro: A/O x 3, CNs II-XII grossly intact, no focal deficits, normal motor/sensation  Pulses: palpable distal pulses    I&O's Summary    I & Os for current day (as of 28 Feb 2017 07:59)  =============================================  IN: 740 ml / OUT: 270 ml / NET: 470 ml      LABS:                        11.9   6.2   )-----------( 311      ( 28 Feb 2017 06:53 )             38.2     28 Feb 2017 06:53    135    |  100    |  15     ----------------------------<  89     4.1     |  28     |  0.62     Ca    9.3        28 Feb 2017 06:53  Phos  3.8       28 Feb 2017 06:53  Mg     2.3       28 Feb 2017 06:53          CAPILLARY BLOOD GLUCOSE        RADIOLOGY & ADDITIONAL STUDIES:

## 2017-02-28 NOTE — PROGRESS NOTE ADULT - ASSESSMENT
61y Female s/p debridement of chronic draining abdominal wound, now with NPWT dressing  Pain/nausea control  Bactrim for staph aureus   Probiotics  SQH/SCDs  Encourage OOBA/IS  AM labs  Was NPO past Mn for OR today for wound closure

## 2017-02-28 NOTE — BRIEF OPERATIVE NOTE - OPERATION/FINDINGS
repair of ventral hernia; flap closure of open abdominal wound
Chronic draining sinus in midline below umbilicus.  Skin and subcutaneous tissue excised.  2 areas of suture granuloma/abscess identified with apparent Ethibond suture noted and purulence at each site.  Cultures sent x 3.  Wound 9 x 6 cm.  VAC dressing placed.

## 2017-02-28 NOTE — BRIEF OPERATIVE NOTE - PROCEDURE
Negative pressure wound therapy of area less than 50 square cm  02/20/2017    Active  KIA  Wound debridement, excisional  02/20/2017    Active  KIA
Hernia repair  02/28/2017  wound flap closure  Active  [id number]

## 2017-02-28 NOTE — PROGRESS NOTE ADULT - SUBJECTIVE AND OBJECTIVE BOX
POST-OPERATIVE NOTE    Procedure: Abdominal wound closure    Diagnosis/Indication: chronic abdominal wound    Surgeon: Dr. Mena    S: Pt seen and examined at bedside, currently complaining of subxiphoid chest discomfort described as a "pulling" sensation.  She denies radiation of the pain, it is nonpleuritic, and she notes that its worse if she palpates the area herself. Denies SOB, dyspnea. Pain controlled with medication. Denies nausea, vomiting.  On repeat evaluation she reports the chest discomfort is improving on its own accord.    O:  T(C): 36.9, Max: 36.9 (02-28 @ 21:00)  T(F): 992, Max: 992 (02-28 @ 21:15)  HR: 93 (75 - 93)  BP: 156/77 (140/84 - 169/92)  RR: 21 (10 - 21)  SpO2: 100% (97% - 100%)  Wt(kg): --                        11.9   6.2   )-----------( 311      ( 28 Feb 2017 06:53 )             38.2     28 Feb 2017 06:53    135    |  100    |  15     ----------------------------<  89     4.1     |  28     |  0.62     Ca    9.3        28 Feb 2017 06:53  Phos  3.8       28 Feb 2017 06:53  Mg     2.3       28 Feb 2017 06:53        Gen: NAD, resting comfortably in bed  C/V: NSR  Pulm: Nonlabored breathing, no respiratory distress, CTAB,   Abd: soft, ttp over vac, nd, abd binder in place, provena vac dressing in place and functioning, SQ MARIAH with s/s output.  Extrem: WWP, minimal peripheral edema, SCDs in place      A/P: 61y Female s/p abdominal wound closure  Reg diet  IVF until tolerating PO  IV abx  TOV tonight  Pain/nausea control  SQH/SCDs  Home tomorrow POST-OPERATIVE NOTE    Procedure: Abdominal wound closure    Diagnosis/Indication: chronic abdominal wound    Surgeon: Dr. Mena    S: Pt seen and examined at bedside, currently complaining of subxiphoid chest discomfort described as a "pulling" sensation.  She denies radiation of the pain, it is nonpleuritic, and she notes that its worse if she palpates the area herself. Denies SOB, dyspnea. Pain controlled with medication. Denies nausea, vomiting.  On repeat evaluation she reports the chest discomfort is improving on its own accord.    O:  T(C): 36.9, Max: 36.9 (02-28 @ 21:00)  T(F): 992, Max: 992 (02-28 @ 21:15)  HR: 93 (75 - 93)  BP: 156/77 (140/84 - 169/92)  RR: 21 (10 - 21)  SpO2: 100% (97% - 100%)  Wt(kg): --                        11.9   6.2   )-----------( 311      ( 28 Feb 2017 06:53 )             38.2     28 Feb 2017 06:53    135    |  100    |  15     ----------------------------<  89     4.1     |  28     |  0.62     Ca    9.3        28 Feb 2017 06:53  Phos  3.8       28 Feb 2017 06:53  Mg     2.3       28 Feb 2017 06:53        Gen: NAD, resting comfortably in bed  C/V: NSR  Pulm: Nonlabored breathing, no respiratory distress, CTAB,   Abd: soft, ttp over vac, nd, abd binder in place, provena vac dressing in place and functioning, SQ MARIAH with s/s output.  Extrem: WWP, minimal peripheral edema, SCDs in place    EKG obtained for chest discomfort, NSR with NO ST elevation      A/P: 61y Female s/p abdominal wound closure  Reg diet  IVF until tolerating PO  continue Bactrim  TOV tonight  Pain/nausea control  SQH/SCDs  Home tomorrow

## 2017-02-28 NOTE — PROGRESS NOTE ADULT - ATTENDING COMMENTS
Patient seen and examined at bedside.  Agree with above.  To OR today with Dr. Mena for wound closure/revision.

## 2017-02-28 NOTE — BRIEF OPERATIVE NOTE - POST-OP DX
Draining cutaneous sinus tract  02/20/2017    Active  Sapna Morelos  Suture granuloma, initial encounter  02/20/2017    Active  Sapna Morelos
Draining cutaneous sinus tract  02/20/2017    Active  Sapna Morelos  Suture granuloma, initial encounter  02/20/2017    Active  Sapna Morelos

## 2017-02-28 NOTE — BRIEF OPERATIVE NOTE - PRE-OP DX
Draining cutaneous sinus tract  02/20/2017    Active  Sapna Morelos  Open wound anterior abdominal wall, subsequent encounter  02/28/2017    Active  Juan Mnea
Draining cutaneous sinus tract  02/20/2017    Active  Sapna Morelos

## 2017-03-01 VITALS
SYSTOLIC BLOOD PRESSURE: 126 MMHG | TEMPERATURE: 98 F | DIASTOLIC BLOOD PRESSURE: 83 MMHG | HEART RATE: 67 BPM | RESPIRATION RATE: 16 BRPM | OXYGEN SATURATION: 95 %

## 2017-03-01 RX ORDER — SACCHAROMYCES BOULARDII 250 MG
1 POWDER IN PACKET (EA) ORAL
Qty: 20 | Refills: 0 | OUTPATIENT
Start: 2017-03-01 | End: 2017-03-11

## 2017-03-01 RX ADMIN — Medication 1 TABLET(S): at 06:22

## 2017-03-01 RX ADMIN — Medication 1 TABLET(S): at 13:50

## 2017-03-01 RX ADMIN — Medication 1 TABLET(S): at 09:29

## 2017-03-01 RX ADMIN — Medication 100 MILLIGRAM(S): at 06:22

## 2017-03-01 RX ADMIN — HEPARIN SODIUM 5000 UNIT(S): 5000 INJECTION INTRAVENOUS; SUBCUTANEOUS at 06:22

## 2017-03-01 NOTE — DISCHARGE NOTE ADULT - MEDICATION SUMMARY - MEDICATIONS TO TAKE
I will START or STAY ON the medications listed below when I get home from the hospital:    Percocet 5/325 325 mg-5 mg oral tablet  -- 1 tab(s) by mouth every 6 hours, As Needed -for severe pain MDD:6  -- Caution federal law prohibits the transfer of this drug to any person other  than the person for whom it was prescribed.  May cause drowsiness.  Alcohol may intensify this effect.  Use care when operating dangerous machinery.  This prescription cannot be refilled.  This product contains acetaminophen.  Do not use  with any other product containing acetaminophen to prevent possible liver damage.  Using more of this medication than prescribed may cause serious breathing problems.    -- Indication: For pain medication    Augmentin 875 mg-125 mg oral tablet  -- 875 milligram(s) by mouth every 12 hours  -- Finish all this medication unless otherwise directed by prescriber.  Take with food or milk.    -- Indication: For Antibiotic    Florastor 250 mg oral capsule  -- 1 cap(s) by mouth 2 times a day  -- Indication: For probiotic

## 2017-03-01 NOTE — DISCHARGE NOTE ADULT - HOSPITAL COURSE
62 yo F presented to [facility] with chronic wound drainage.  Pt was taken to the OR for debridement and closure.  Pt tolerated the procedures without complication. A provena vac was placed.  At time of discharge pt tolerating diet, pain controlled, and AVSS.

## 2017-03-01 NOTE — PROGRESS NOTE ADULT - SUBJECTIVE AND OBJECTIVE BOX
O/N: POC WNL, provena vac on and functioning, passed TOV, tolerating diet, heplocked  2/28: OR today for closure.     STATUS POST:  Chronic wound debridement and vac placement POD8  Wound closure and provena vac placement POD1 O/N: POC WNL, provena vac on and functioning, passed TOV, tolerating diet, heplocked  2/28: OR today for closure.     STATUS POST:  Chronic wound debridement and vac placement POD8  Wound closure and provena vac placement POD1      SUBJECTIVE:     Pain Control Adequate: [x ] YES [ ] NO  Nausea: [ ] YES [x ] NO            Vomiting: [ ] YES [x ] NO  Diarrhea: [ ] YES [x ] NO         Constipation: [ ] YES [ x] NO     Chest Pain: [ ] YES [x ] NO    SOB:  [ ] YES [x ] NO    MEDICATIONS  (STANDING):  heparin  Injectable 5000Unit(s) SubCutaneous every 8 hours  lactobacillus acidophilus 1Tablet(s) Oral three times a day with meals  trimethoprim  160 mG/sulfamethoxazole 800 mG 1Tablet(s) Oral two times a day  senna 2Tablet(s) Oral at bedtime  docusate sodium 100milliGRAM(s) Oral three times a day    MEDICATIONS  (PRN):  ondansetron Injectable 4milliGRAM(s) IV Push every 6 hours PRN Nausea  acetaminophen   Tablet. 650milliGRAM(s) Oral every 6 hours PRN Mild Pain (1 - 3)  oxyCODONE  5 mG/acetaminophen 325 mG 1Tablet(s) Oral every 4 hours PRN Moderate Pain (4 - 6)  oxyCODONE  5 mG/acetaminophen 325 mG 2Tablet(s) Oral every 4 hours PRN Severe Pain (7 - 10)      Vital Signs Last 24 Hrs  T(C): 36.2, Max: 37.5 (03-01 @ 00:14)  T(F): 97.2, Max: 992 (02-28 @ 21:15)  HR: 70 (68 - 99)  BP: 114/77 (106/70 - 169/92)  BP(mean): --  RR: 18 (10 - 21)  SpO2: 97% (95% - 100%)    PHYSICAL EXAM:      Constitutional: A&Ox3      Respiratory: non labored breathing, no respiratory distress    Cardiovascular: NSR, RRR    Gastrointestinal: soft, NT, ND                 Incision: CDI    Genitourinary: voiding    Extremities: (-) edema                  I&O's Detail    I & Os for current day (as of 01 Mar 2017 07:45)  =============================================  IN:    lactated ringers.: 1740 ml    Oral Fluid: 630 ml    Total IN: 2370 ml  ---------------------------------------------  OUT:    Voided: 1750 ml    Bulb: 35 ml    Total OUT: 1785 ml  ---------------------------------------------  Total NET: 585 ml      LABS:                        11.9   6.2   )-----------( 311      ( 28 Feb 2017 06:53 )             38.2     28 Feb 2017 06:53    135    |  100    |  15     ----------------------------<  89     4.1     |  28     |  0.62     Ca    9.3        28 Feb 2017 06:53  Phos  3.8       28 Feb 2017 06:53  Mg     2.3       28 Feb 2017 06:53            RADIOLOGY & ADDITIONAL STUDIES:

## 2017-03-01 NOTE — DISCHARGE NOTE ADULT - CARE PROVIDER_API CALL
Sapna Morelos), Surgery  [address] [address]  Phone: [phone]  Fax: [phone]    Juan Mena), Plastic Surgery; Surgery  [address] [place] [id number]  Phone: [phone]  Fax: [phone]

## 2017-03-01 NOTE — DISCHARGE NOTE ADULT - CARE PLAN
Principal Discharge DX:	Abdominal wound dehiscence  Goal:	wound debridement and closure  Instructions for follow-up, activity and diet:	Please follow up with Dr. Morelos in 1-2 weeks.  Please call her office to make an appointment.  Please also follow up with Dr. Mena in 1 week. Call his office to make an appointment.  You may resume your regular diet as tolerated.  You have been prescribed percocet for pain, please take 1 tablet every 6 hours as needed for pain.  Please do not drive or operate machinery while on this medication.  You have been prescribed augmentin as you antibiotic.  Please take as directed and until completion. Please do not wet the provena vac. Please alert Dr. Morelos or return to the ED if you develop fever greater than 101F, pain not controlled by pain medication, bleeding from incision site, nausea, vomiting, inability to pass gas, chest pain, or shortness of breath.

## 2017-03-01 NOTE — PROGRESS NOTE ADULT - PROVIDER SPECIALTY LIST ADULT
Plastic Surgery
Surgery
Plastic Surgery
Surgery

## 2017-03-01 NOTE — PROGRESS NOTE ADULT - ASSESSMENT
61y Female s/p abdominal wound closure  Reg diet  continue Bactrim  Pain/nausea control  SQH/SCDs  Home today

## 2017-03-01 NOTE — DISCHARGE NOTE ADULT - PLAN OF CARE
wound debridement and closure Please follow up with Dr. Morelos in 1-2 weeks.  Please call her office to make an appointment.  Please also follow up with Dr. Mena in 1 week. Call his office to make an appointment.  You may resume your regular diet as tolerated.  You have been prescribed percocet for pain, please take 1 tablet every 6 hours as needed for pain.  Please do not drive or operate machinery while on this medication.  You have been prescribed augmentin as you antibiotic.  Please take as directed and until completion. Please do not wet the provena vac. Please alert Dr. Morelos or return to the ED if you develop fever greater than 101F, pain not controlled by pain medication, bleeding from incision site, nausea, vomiting, inability to pass gas, chest pain, or shortness of breath.

## 2017-03-01 NOTE — DISCHARGE NOTE ADULT - CARE PROVIDERS DIRECT ADDRESSES
[email or web address],[email or web address],DirectAddress_Unknown

## 2017-03-01 NOTE — DISCHARGE NOTE ADULT - PATIENT PORTAL LINK FT
“You can access the FollowHealth Patient Portal, offered by [facility], by registering with the following website: [email or web address]”

## 2017-03-03 ENCOUNTER — MEDICATION RENEWAL (OUTPATIENT)
Age: 52
End: 2017-03-03

## 2017-03-03 DIAGNOSIS — L98.499 NON-PRESSURE CHRONIC ULCER OF SKIN OF OTHER SITES WITH UNSPECIFIED SEVERITY: ICD-10-CM

## 2017-03-03 DIAGNOSIS — Z88.5 ALLERGY STATUS TO NARCOTIC AGENT: ICD-10-CM

## 2017-03-03 DIAGNOSIS — E66.9 OBESITY, UNSPECIFIED: ICD-10-CM

## 2017-03-03 DIAGNOSIS — Z79.4 LONG TERM (CURRENT) USE OF INSULIN: ICD-10-CM

## 2017-03-03 DIAGNOSIS — T81.89XA OTHER COMPLICATIONS OF PROCEDURES, NOT ELSEWHERE CLASSIFIED, INITIAL ENCOUNTER: ICD-10-CM

## 2017-03-03 DIAGNOSIS — B99.9 UNSPECIFIED INFECTIOUS DISEASE: ICD-10-CM

## 2017-03-03 DIAGNOSIS — T81.4XXA INFECTION FOLLOWING A PROCEDURE, INITIAL ENCOUNTER: ICD-10-CM

## 2017-03-03 DIAGNOSIS — Z87.11 PERSONAL HISTORY OF PEPTIC ULCER DISEASE: ICD-10-CM

## 2017-03-03 DIAGNOSIS — I10 ESSENTIAL (PRIMARY) HYPERTENSION: ICD-10-CM

## 2017-03-03 DIAGNOSIS — T81.31XA DISRUPTION OF EXTERNAL OPERATION (SURGICAL) WOUND, NOT ELSEWHERE CLASSIFIED, INITIAL ENCOUNTER: ICD-10-CM
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
## 2017-03-03 DIAGNOSIS — Z79.01 LONG TERM (CURRENT) USE OF ANTICOAGULANTS: ICD-10-CM

## 2017-03-03 LAB — SURGICAL PATHOLOGY STUDY: SIGNIFICANT CHANGE UP

## 2017-03-03 RX ORDER — OXYCODONE AND ACETAMINOPHEN 5; 325 MG/1; MG/1
5-325 TABLET ORAL
Qty: 30 | Refills: 0 | Status: ACTIVE | COMMUNITY
Start: 2017-03-03 | End: 1900-01-01

## 2017-03-03 RX ORDER — SACCHAROMYCES BOULARDII 50 MG
250 CAPSULE ORAL
Qty: 30 | Refills: 6 | Status: ACTIVE | COMMUNITY
Start: 2017-03-03 | End: 1900-01-01

## 2017-03-03 RX ORDER — AMOXICILLIN AND CLAVULANATE POTASSIUM 875; 125 MG/1; MG/1
875-125 TABLET, COATED ORAL
Qty: 14 | Refills: 0 | Status: ACTIVE | COMMUNITY
Start: 2017-03-03 | End: 1900-01-01

## 2017-03-16 DIAGNOSIS — K43.2 INCISIONAL HERNIA WITHOUT OBSTRUCTION OR GANGRENE: ICD-10-CM

## 2017-03-16 DIAGNOSIS — Z18.89 OTHER SPECIFIED RETAINED FOREIGN BODY FRAGMENTS: ICD-10-CM

## 2017-03-16 DIAGNOSIS — L92.3 FOREIGN BODY GRANULOMA OF THE SKIN AND SUBCUTANEOUS TISSUE: ICD-10-CM

## 2017-05-09 ENCOUNTER — EMERGENCY (EMERGENCY)
Facility: HOSPITAL | Age: 52
LOS: 1 days | Discharge: LEFT BEFORE DISCHARGE | End: 2017-05-09
Admitting: EMERGENCY MEDICINE

## 2017-05-09 VITALS
TEMPERATURE: 98 F | HEART RATE: 120 BPM | RESPIRATION RATE: 14 BRPM | DIASTOLIC BLOOD PRESSURE: 95 MMHG | WEIGHT: 199.96 LBS | OXYGEN SATURATION: 97 % | SYSTOLIC BLOOD PRESSURE: 159 MMHG

## 2017-05-09 DIAGNOSIS — R10.9 UNSPECIFIED ABDOMINAL PAIN: ICD-10-CM

## 2017-05-09 DIAGNOSIS — G89.18 OTHER ACUTE POSTPROCEDURAL PAIN: ICD-10-CM

## 2017-05-09 DIAGNOSIS — Z98.0 INTESTINAL BYPASS AND ANASTOMOSIS STATUS: Chronic | ICD-10-CM

## 2017-05-09 DIAGNOSIS — Z98.890 OTHER SPECIFIED POSTPROCEDURAL STATES: Chronic | ICD-10-CM

## 2017-05-09 DIAGNOSIS — Z90.89 ACQUIRED ABSENCE OF OTHER ORGANS: Chronic | ICD-10-CM

## 2017-05-09 DIAGNOSIS — K56.60 UNSPECIFIED INTESTINAL OBSTRUCTION: Chronic | ICD-10-CM

## 2017-05-09 DIAGNOSIS — Z98.89 OTHER SPECIFIED POSTPROCEDURAL STATES: Chronic | ICD-10-CM

## 2017-05-09 NOTE — ED ADULT TRIAGE NOTE - CHIEF COMPLAINT QUOTE
abdominal surgery 4 weeks ago to repair wound from previous surgery - having abdominal pain - told by MD to come here - instructed to get EKG in triage and went to make a phone call

## 2017-12-29 NOTE — ED PROVIDER NOTE - SKIN, MLM
[facility]  Discharge Summary  Gynecology      Admit Date: 12/29/2017    Discharge Date and Time: 12/29/2017    Attending Physician: Darlene att. providers found     Discharge Provider: Evelyn Campa    Reason for Admission: Hysteroscopy D&C, truclear of polyp    Procedures Performed: Procedure(s) (LRB):  HYSTEROSCOPY D&C (N/A)    Hospital Course (synopsis of major diagnoses, care, treatment, and services provided during the course of the hospital stay): Patient was admitted for surgery. Following surgery she was transferred to the floor and underwent routine postoperative care. She tolerated po, ambulated without difficulty, and pain was well controlled. She remained afebrile throughout hospital course and her labs were stable. She was discharged to home in stable condition.      Consults: none    Significant Diagnostic Studies: Labs: CBC Lab Results   Component Value Date    WBC 9.01 12/29/2017    HGB 13.7 12/29/2017    HCT 40.5 12/29/2017    MCV 86 12/29/2017     12/29/2017       Final Diagnoses:   Principal Problem: PMB (postmenopausal bleeding)   Secondary Diagnoses:   Active Hospital Problems    Diagnosis  POA    *PMB (postmenopausal bleeding) [N95.0]  Yes      Resolved Hospital Problems    Diagnosis Date Resolved POA   No resolved problems to display.       Discharged Condition: stable    Disposition: Home    Follow Up/Patient Instructions: 2 weeks    Medications:  Reconciled Home Medications: Discharge Medication List as of 12/29/2017  8:40 AM      START taking these medications    Details   ibuprofen (ADVIL,MOTRIN) 600 MG tablet Take 1 tablet (600 mg total) by mouth every 6 (six) hours as needed for Pain., Starting Fri 12/29/2017, Until Sat 12/29/2018, Normal         CONTINUE these medications which have NOT CHANGED    Details   biotin 1,000 mcg Chew Take by mouth., Historical Med      hydrochlorothiazide (MICROZIDE) 12.5 mg capsule Take 25 mg by mouth once daily. , Starting Tue  5/3/2016, Historical Med      sertraline (ZOLOFT) 25 MG tablet Take 25 mg by mouth once daily., Until Discontinued, Historical Med             Discharge Procedure Orders  Diet general     Activity as tolerated     Call MD for:  temperature >100.4     Call MD for:  persistent nausea and vomiting     Call MD for:  severe uncontrolled pain     Call MD for:  difficulty breathing, headache or visual disturbances     Call MD for:  redness, tenderness, or signs of infection (pain, swelling, redness, odor or green/yellow discharge around incision site)     Call MD for:  hives     Call MD for:  persistent dizziness or light-headedness     Call MD for:  extreme fatigue     Call MD for:     Remove dressing in 24 hours       Follow-up Information     Evelyn Campa MD In 4 weeks.    Specialties:  Obstetrics, Gynecology, Obstetrics and Gynecology  Why:  Post-operative visit  Contact information:  [address] [facility] [address]  [address]  [place] [id number] [phone]                    Skin normal color for race, warm, dry and intact. No evidence of rash.

## 2018-01-31 ENCOUNTER — OUTPATIENT (OUTPATIENT)
Dept: OUTPATIENT SERVICES | Facility: HOSPITAL | Age: 53
LOS: 1 days | Discharge: HOME | End: 2018-01-31

## 2018-01-31 DIAGNOSIS — Z98.0 INTESTINAL BYPASS AND ANASTOMOSIS STATUS: Chronic | ICD-10-CM

## 2018-01-31 DIAGNOSIS — Z98.89 OTHER SPECIFIED POSTPROCEDURAL STATES: Chronic | ICD-10-CM

## 2018-01-31 DIAGNOSIS — Z98.890 OTHER SPECIFIED POSTPROCEDURAL STATES: Chronic | ICD-10-CM

## 2018-01-31 DIAGNOSIS — Z90.89 ACQUIRED ABSENCE OF OTHER ORGANS: Chronic | ICD-10-CM

## 2018-01-31 DIAGNOSIS — K56.60 UNSPECIFIED INTESTINAL OBSTRUCTION: Chronic | ICD-10-CM

## 2018-02-14 ENCOUNTER — EMERGENCY (EMERGENCY)
Facility: HOSPITAL | Age: 53
LOS: 1 days | Discharge: HOME | End: 2018-02-14
Attending: EMERGENCY MEDICINE | Admitting: EMERGENCY MEDICINE
Payer: COMMERCIAL

## 2018-02-14 VITALS
SYSTOLIC BLOOD PRESSURE: 137 MMHG | TEMPERATURE: 97 F | RESPIRATION RATE: 18 BRPM | OXYGEN SATURATION: 98 % | WEIGHT: 210.1 LBS | DIASTOLIC BLOOD PRESSURE: 109 MMHG | HEART RATE: 90 BPM | HEIGHT: 71 IN

## 2018-02-14 DIAGNOSIS — Z98.89 OTHER SPECIFIED POSTPROCEDURAL STATES: Chronic | ICD-10-CM

## 2018-02-14 DIAGNOSIS — Z79.899 OTHER LONG TERM (CURRENT) DRUG THERAPY: ICD-10-CM

## 2018-02-14 DIAGNOSIS — K56.60 UNSPECIFIED INTESTINAL OBSTRUCTION: Chronic | ICD-10-CM

## 2018-02-14 DIAGNOSIS — R05 COUGH: ICD-10-CM

## 2018-02-14 DIAGNOSIS — Z98.0 INTESTINAL BYPASS AND ANASTOMOSIS STATUS: Chronic | ICD-10-CM

## 2018-02-14 DIAGNOSIS — I10 ESSENTIAL (PRIMARY) HYPERTENSION: ICD-10-CM

## 2018-02-14 DIAGNOSIS — Z98.890 OTHER SPECIFIED POSTPROCEDURAL STATES: Chronic | ICD-10-CM

## 2018-02-14 DIAGNOSIS — Z90.89 ACQUIRED ABSENCE OF OTHER ORGANS: Chronic | ICD-10-CM

## 2018-02-14 DIAGNOSIS — Z79.1 LONG TERM (CURRENT) USE OF NON-STEROIDAL ANTI-INFLAMMATORIES (NSAID): ICD-10-CM

## 2018-02-14 DIAGNOSIS — E78.00 PURE HYPERCHOLESTEROLEMIA, UNSPECIFIED: ICD-10-CM

## 2018-02-14 DIAGNOSIS — Z88.5 ALLERGY STATUS TO NARCOTIC AGENT: ICD-10-CM

## 2018-02-14 PROCEDURE — 99283 EMERGENCY DEPT VISIT LOW MDM: CPT

## 2018-02-14 PROCEDURE — 99282 EMERGENCY DEPT VISIT SF MDM: CPT

## 2018-02-14 NOTE — ED PROVIDER NOTE - OBJECTIVE STATEMENT
61 y/o f presents stating has had a cough x 4 weeks.  Pt stating initially had symptoms of sore throat, nasal congestion which have resolved, although has persistent cough.  Pt denies fever, chills, SOB, all other ROS negative.

## 2018-02-14 NOTE — ED ADULT NURSE NOTE - OBJECTIVE STATEMENT
Pt presents to ED c/o cough. Pt reports cough most days for 1 month, minimal mucus production per pt. Pt also with intermittent sore throat and a few episodes of vomiting throughout the month. Pt reports taking mucinex and amoxicillin without relief. Pt also s/p liposuction to bilateral posterior hips x2 weeks ago, pt saw surgeon for follow up without complications, though pt now reporting pain to R posterior hip currently 8/10. Pt denies fevers, chills, dizziness/lightheadedness, CP, increased SOB from baseline, nausea, vomiting at this time, diarrhea, difficulty urinating. Pt presents in NAD speaking full sentences without difficulty ambulatory through triage. Pt HTN in triage, states "the last time I was at my doctors office it was high but they didn't want to put me on medicine." Bilateral posterior hips hard to touch, no warmth, no redness, no breaks in skin/drainage.

## 2018-02-14 NOTE — ED PROVIDER NOTE - MEDICAL DECISION MAKING DETAILS
63 y/o f cough x 4 weeks; pt afebrile, well appearing, discussed will get cxr, which was ordered.  Pt didn't get xray, can't find in ED, pt eloped.

## 2018-02-14 NOTE — ED ADULT NURSE NOTE - PMH
Bowel obstruction    DVT (deep venous thrombosis)    High cholesterol    HLD (hyperlipidemia)    HTN (hypertension)    HTN (hypertension)    Pancreatitis    Pancreatitis    PUD (peptic ulcer disease)

## 2018-02-14 NOTE — ED ADULT TRIAGE NOTE - CHIEF COMPLAINT QUOTE
"I have a cold for about 4 weeks that won't go away and I have back pain. I had back surgery 2 weeks ago."

## 2018-02-14 NOTE — ED ADULT NURSE NOTE - NSSISCREENINGQ3_ED_A_ED
individual instruction/pictorial/computer/internet/skill demonstration/verbal instruction/written material/group instruction/video/audio No

## 2019-02-16 ENCOUNTER — EMERGENCY (EMERGENCY)
Facility: HOSPITAL | Age: 54
LOS: 1 days | Discharge: HOME | End: 2019-02-16
Attending: EMERGENCY MEDICINE | Admitting: EMERGENCY MEDICINE
Payer: COMMERCIAL

## 2019-02-16 VITALS
SYSTOLIC BLOOD PRESSURE: 166 MMHG | HEART RATE: 87 BPM | OXYGEN SATURATION: 98 % | DIASTOLIC BLOOD PRESSURE: 98 MMHG | RESPIRATION RATE: 18 BRPM | TEMPERATURE: 98 F

## 2019-02-16 VITALS
OXYGEN SATURATION: 99 % | DIASTOLIC BLOOD PRESSURE: 95 MMHG | TEMPERATURE: 98 F | RESPIRATION RATE: 18 BRPM | WEIGHT: 214.95 LBS | HEART RATE: 93 BPM | SYSTOLIC BLOOD PRESSURE: 151 MMHG

## 2019-02-16 DIAGNOSIS — Z98.890 OTHER SPECIFIED POSTPROCEDURAL STATES: Chronic | ICD-10-CM

## 2019-02-16 DIAGNOSIS — Z90.89 ACQUIRED ABSENCE OF OTHER ORGANS: Chronic | ICD-10-CM

## 2019-02-16 DIAGNOSIS — K56.60 UNSPECIFIED INTESTINAL OBSTRUCTION: Chronic | ICD-10-CM

## 2019-02-16 DIAGNOSIS — Z98.0 INTESTINAL BYPASS AND ANASTOMOSIS STATUS: Chronic | ICD-10-CM

## 2019-02-16 DIAGNOSIS — Z98.89 OTHER SPECIFIED POSTPROCEDURAL STATES: Chronic | ICD-10-CM

## 2019-02-16 LAB
ALBUMIN SERPL ELPH-MCNC: 4.3 G/DL — SIGNIFICANT CHANGE UP (ref 3.3–5)
ALP SERPL-CCNC: 98 U/L — SIGNIFICANT CHANGE UP (ref 40–120)
ALT FLD-CCNC: 16 U/L — SIGNIFICANT CHANGE UP (ref 10–45)
ANION GAP SERPL CALC-SCNC: 10 MMOL/L — SIGNIFICANT CHANGE UP (ref 5–17)
APPEARANCE UR: CLEAR — SIGNIFICANT CHANGE UP
APTT BLD: 32 SEC — SIGNIFICANT CHANGE UP (ref 27.5–36.3)
AST SERPL-CCNC: 21 U/L — SIGNIFICANT CHANGE UP (ref 10–40)
BASOPHILS # BLD AUTO: 0.03 K/UL — SIGNIFICANT CHANGE UP (ref 0–0.2)
BASOPHILS NFR BLD AUTO: 0.6 % — SIGNIFICANT CHANGE UP (ref 0–2)
BILIRUB SERPL-MCNC: 0.3 MG/DL — SIGNIFICANT CHANGE UP (ref 0.2–1.2)
BILIRUB UR-MCNC: NEGATIVE — SIGNIFICANT CHANGE UP
BUN SERPL-MCNC: 10 MG/DL — SIGNIFICANT CHANGE UP (ref 7–23)
CALCIUM SERPL-MCNC: 9.7 MG/DL — SIGNIFICANT CHANGE UP (ref 8.4–10.5)
CHLORIDE SERPL-SCNC: 104 MMOL/L — SIGNIFICANT CHANGE UP (ref 96–108)
CO2 SERPL-SCNC: 28 MMOL/L — SIGNIFICANT CHANGE UP (ref 22–31)
COLOR SPEC: YELLOW — SIGNIFICANT CHANGE UP
CREAT SERPL-MCNC: 0.74 MG/DL — SIGNIFICANT CHANGE UP (ref 0.5–1.3)
DIFF PNL FLD: ABNORMAL
EOSINOPHIL # BLD AUTO: 0.04 K/UL — SIGNIFICANT CHANGE UP (ref 0–0.5)
EOSINOPHIL NFR BLD AUTO: 0.7 % — SIGNIFICANT CHANGE UP (ref 0–6)
GLUCOSE SERPL-MCNC: 98 MG/DL — SIGNIFICANT CHANGE UP (ref 70–99)
GLUCOSE UR QL: NEGATIVE — SIGNIFICANT CHANGE UP
HCT VFR BLD CALC: 40.7 % — SIGNIFICANT CHANGE UP (ref 34.5–45)
HGB BLD-MCNC: 12.1 G/DL — SIGNIFICANT CHANGE UP (ref 11.5–15.5)
IMM GRANULOCYTES NFR BLD AUTO: 0.2 % — SIGNIFICANT CHANGE UP (ref 0–1.5)
INR BLD: 1.01 — SIGNIFICANT CHANGE UP (ref 0.88–1.16)
KETONES UR-MCNC: NEGATIVE — SIGNIFICANT CHANGE UP
LEUKOCYTE ESTERASE UR-ACNC: ABNORMAL
LIDOCAIN IGE QN: 33 U/L — SIGNIFICANT CHANGE UP (ref 7–60)
LYMPHOCYTES # BLD AUTO: 2.36 K/UL — SIGNIFICANT CHANGE UP (ref 1–3.3)
LYMPHOCYTES # BLD AUTO: 43.9 % — SIGNIFICANT CHANGE UP (ref 13–44)
MCHC RBC-ENTMCNC: 24.1 PG — LOW (ref 27–34)
MCHC RBC-ENTMCNC: 29.7 GM/DL — LOW (ref 32–36)
MCV RBC AUTO: 81.1 FL — SIGNIFICANT CHANGE UP (ref 80–100)
MONOCYTES # BLD AUTO: 0.45 K/UL — SIGNIFICANT CHANGE UP (ref 0–0.9)
MONOCYTES NFR BLD AUTO: 8.4 % — SIGNIFICANT CHANGE UP (ref 2–14)
NEUTROPHILS # BLD AUTO: 2.49 K/UL — SIGNIFICANT CHANGE UP (ref 1.8–7.4)
NEUTROPHILS NFR BLD AUTO: 46.2 % — SIGNIFICANT CHANGE UP (ref 43–77)
NITRITE UR-MCNC: POSITIVE
NRBC # BLD: 0 /100 WBCS — SIGNIFICANT CHANGE UP (ref 0–0)
PH UR: 6.5 — SIGNIFICANT CHANGE UP (ref 5–8)
PLATELET # BLD AUTO: 295 K/UL — SIGNIFICANT CHANGE UP (ref 150–400)
POTASSIUM SERPL-MCNC: 4.4 MMOL/L — SIGNIFICANT CHANGE UP (ref 3.5–5.3)
POTASSIUM SERPL-SCNC: 4.4 MMOL/L — SIGNIFICANT CHANGE UP (ref 3.5–5.3)
PROT SERPL-MCNC: 8.3 G/DL — SIGNIFICANT CHANGE UP (ref 6–8.3)
PROT UR-MCNC: NEGATIVE MG/DL — SIGNIFICANT CHANGE UP
PROTHROM AB SERPL-ACNC: 11.4 SEC — SIGNIFICANT CHANGE UP (ref 10–12.9)
RBC # BLD: 5.02 M/UL — SIGNIFICANT CHANGE UP (ref 3.8–5.2)
RBC # FLD: 16.2 % — HIGH (ref 10.3–14.5)
SODIUM SERPL-SCNC: 142 MMOL/L — SIGNIFICANT CHANGE UP (ref 135–145)
SP GR SPEC: <=1.005 — SIGNIFICANT CHANGE UP (ref 1–1.03)
UROBILINOGEN FLD QL: 0.2 E.U./DL — SIGNIFICANT CHANGE UP
WBC # BLD: 5.38 K/UL — SIGNIFICANT CHANGE UP (ref 3.8–10.5)
WBC # FLD AUTO: 5.38 K/UL — SIGNIFICANT CHANGE UP (ref 3.8–10.5)

## 2019-02-16 PROCEDURE — 85025 COMPLETE CBC W/AUTO DIFF WBC: CPT

## 2019-02-16 PROCEDURE — 93005 ELECTROCARDIOGRAM TRACING: CPT

## 2019-02-16 PROCEDURE — 99284 EMERGENCY DEPT VISIT MOD MDM: CPT | Mod: 25

## 2019-02-16 PROCEDURE — 74022 RADEX COMPL AQT ABD SERIES: CPT | Mod: 26

## 2019-02-16 PROCEDURE — 74177 CT ABD & PELVIS W/CONTRAST: CPT | Mod: 26

## 2019-02-16 PROCEDURE — 74022 RADEX COMPL AQT ABD SERIES: CPT

## 2019-02-16 PROCEDURE — 99285 EMERGENCY DEPT VISIT HI MDM: CPT | Mod: 25

## 2019-02-16 PROCEDURE — 85730 THROMBOPLASTIN TIME PARTIAL: CPT

## 2019-02-16 PROCEDURE — 83690 ASSAY OF LIPASE: CPT

## 2019-02-16 PROCEDURE — 85610 PROTHROMBIN TIME: CPT

## 2019-02-16 PROCEDURE — 96374 THER/PROPH/DIAG INJ IV PUSH: CPT | Mod: XU

## 2019-02-16 PROCEDURE — 74177 CT ABD & PELVIS W/CONTRAST: CPT

## 2019-02-16 PROCEDURE — 99285 EMERGENCY DEPT VISIT HI MDM: CPT

## 2019-02-16 PROCEDURE — 81001 URINALYSIS AUTO W/SCOPE: CPT

## 2019-02-16 PROCEDURE — 93010 ELECTROCARDIOGRAM REPORT: CPT

## 2019-02-16 PROCEDURE — 36415 COLL VENOUS BLD VENIPUNCTURE: CPT

## 2019-02-16 PROCEDURE — 80053 COMPREHEN METABOLIC PANEL: CPT

## 2019-02-16 RX ORDER — SODIUM CHLORIDE 9 MG/ML
1000 INJECTION INTRAMUSCULAR; INTRAVENOUS; SUBCUTANEOUS ONCE
Qty: 0 | Refills: 0 | Status: COMPLETED | OUTPATIENT
Start: 2019-02-16 | End: 2019-02-16

## 2019-02-16 RX ORDER — HYDROMORPHONE HYDROCHLORIDE 2 MG/ML
1 INJECTION INTRAMUSCULAR; INTRAVENOUS; SUBCUTANEOUS ONCE
Qty: 0 | Refills: 0 | Status: DISCONTINUED | OUTPATIENT
Start: 2019-02-16 | End: 2019-02-16

## 2019-02-16 RX ORDER — IOHEXOL 300 MG/ML
30 INJECTION, SOLUTION INTRAVENOUS ONCE
Qty: 0 | Refills: 0 | Status: COMPLETED | OUTPATIENT
Start: 2019-02-16 | End: 2019-02-16

## 2019-02-16 RX ADMIN — SODIUM CHLORIDE 1000 MILLILITER(S): 9 INJECTION INTRAMUSCULAR; INTRAVENOUS; SUBCUTANEOUS at 18:21

## 2019-02-16 RX ADMIN — HYDROMORPHONE HYDROCHLORIDE 1 MILLIGRAM(S): 2 INJECTION INTRAMUSCULAR; INTRAVENOUS; SUBCUTANEOUS at 18:20

## 2019-02-16 RX ADMIN — HYDROMORPHONE HYDROCHLORIDE 1 MILLIGRAM(S): 2 INJECTION INTRAMUSCULAR; INTRAVENOUS; SUBCUTANEOUS at 18:30

## 2019-02-16 RX ADMIN — IOHEXOL 30 MILLILITER(S): 300 INJECTION, SOLUTION INTRAVENOUS at 18:22

## 2019-02-16 NOTE — ED PROVIDER NOTE - CARE PROVIDERS DIRECT ADDRESSES
[email or web address]

## 2019-02-16 NOTE — ED PROVIDER NOTE - OBJECTIVE STATEMENT
52 y/o f hx perforated gastric ulcer s/p gastric resection, pancreatitis s/p Billroth II surgery, presents c/o sudden onset mid-upper abdominal pain today.  Pt stating started a few hours after she ate, she feels nauseous, hasn't vomited.  Denies fever, chills, diarrhea, CP, SOB, all other ROS negative.

## 2019-02-16 NOTE — ED PROVIDER NOTE - CARE PROVIDER_API CALL
Sapna Morelos)  Surgery  [address]  [place], [place] [address]  Phone: [phone]  Fax: [phone]  Follow Up Time:

## 2019-02-16 NOTE — ED ADULT NURSE NOTE - NSIMPLEMENTINTERV_GEN_ALL_ED
Implemented All Universal Safety Interventions:  [place] to call system. Call bell, personal items and telephone within reach. Instruct patient to call for assistance. Room bathroom lighting operational. Non-slip footwear when patient is off stretcher. Physically safe environment: no spills, clutter or unnecessary equipment. Stretcher in lowest position, wheels locked, appropriate side rails in place.

## 2019-02-16 NOTE — ED PROVIDER NOTE - PROGRESS NOTE DETAILS
pt had bowel movement in ED, seen by surgery, feeling well, will d/c to f/u with Dr. Morelos, return to ED if sx return

## 2019-02-16 NOTE — ED ADULT TRIAGE NOTE - CHIEF COMPLAINT QUOTE
Pt presents to ED with c/o sudden onset periumbilical abdom pain that is sharp and constant appr 30 min ago. Denies nausea/vomiting.  Last BM this am and reported normal. Had taco for lunch. Hx of gastric ulcer and pancreatitis. Denies chest pain, neck pain, L arm pain.

## 2019-02-16 NOTE — ED ADULT NURSE NOTE - OBJECTIVE STATEMENT
Pt reports that she ate taco from food truck at 2pm and approx 45 minutes PTA pt starting having severe epigastric/RUQ pain.

## 2019-02-16 NOTE — ED PROVIDER NOTE - CLINICAL SUMMARY MEDICAL DECISION MAKING FREE TEXT BOX
52 y/o f hx perforated gastric ulcer s/p gastric resection, pancreatitis s/p Billroth II surgery presents c/o abd pain, nausea; vss, tender on exam, labs wnl, CT suggestive of small bowel ileus, surgery consulted and will eval in ED.  F/u surgery recommendations.

## 2019-02-16 NOTE — ED PROVIDER NOTE - PSH
Bowel obstruction    H/O Billroth II operation    History of abdominoplasty    History of abdominoplasty    History of Billroth II operation    History of gastric surgery    History of tonsillectomy

## 2019-02-17 NOTE — CONSULT NOTE ADULT - SUBJECTIVE AND OBJECTIVE BOX
GENERAL SURGERY CONSULT NOTE    HPI:    Ms. Hamm is a 52 yo F with history of PUD with perforation s/p partial gastrectomy with Fela-en-Y GJ in 2006 at OSH, small bowel obstruction concurrent with gallstone pancreatitis s/p Bilroth II, abdominoplasty in 2011 and revision of exploratory laparotomy scar in 2014 complicated by RLE DVT treated with 1 year of anticoagulation (Coumadin) presenting with abdominal pain concerning for possible small bowel obstruction. Patient reports that at 2 AM she consumed a Taco which she tolerated well. At 4 PM she felt a severe sharp 10/10 pain in her left paraumbilical region. She reports that she has never experienced a similar pain in the past. She denies any associated nausea, emesis, fevers, or chills. She reports that her last bowel movement was this morning and passing flatus while in ED. Upon completion of physical examination, patient reported a large amount of further flatus. Upon evaluation at bedside, patient also reports significant improvement of abdominal pain. Patient's last colonoscopy was in 2012 which was reportedly normal. Her last endoscopy was in 2004 which revealed peptic ulcer disease.    In the ED the patient is afebrile and remaining vitals are stable. WBC is 5.4, LFTs wnl, lipase 33. CT ab/pel shows small bowel distended to 3.6 cm throughout the abdomen and concerning for possible small bowel obstruction.    PMH/PSx: PUD with perforation s/p partial gastrectomy with Fela-en-Y GJ in 2006 at OSH, small bowel obstruction concurrent with gallstone pancreatitis s/p Bilroth II, abdominoplasty in 2011 and revision of exploratory laparotomy scar in 2014   Allergies: codeine (pruritis)  Meds: MVA, Ca, B12, Vit C, melatonin  FH: patient denies FH of cancers or IBD  SH: denies tobacco, ETOH, or drug use      Vital Signs Last 24 Hrs  T(C): 36.6 (16 Feb 2019 19:14), Max: 36.7 (16 Feb 2019 17:44)  T(F): 97.8 (16 Feb 2019 19:14), Max: 98 (16 Feb 2019 17:44)  HR: 87 (16 Feb 2019 19:14) (87 - 93)  BP: 166/98 (16 Feb 2019 19:14) (151/95 - 166/98)  BP(mean): --  RR: 18 (16 Feb 2019 19:14) (18 - 18)  SpO2: 98% (16 Feb 2019 19:14) (98% - 99%)    PHYSICAL EXAM:  General: no acute distress  Cardiovascular: NSR  Pulmonary: nonlabored breathing, no respiratory distress  Abdomen: well-healed prior exploratory laparotomy incision without any drainage. Soft, nondistended, tenderness in left paraumbilical region without guarding or rebound.    Extremities: nonedematous    LABS:                        12.1   5.38  )-----------( 295      ( 16 Feb 2019 18:15 )             40.7     02-16    142  |  104  |  10  ----------------------------<  98  4.4   |  28  |  0.74    Ca    9.7      16 Feb 2019 18:15    TPro  8.3  /  Alb  4.3  /  TBili  0.3  /  DBili  x   /  AST  21  /  ALT  16  /  AlkPhos  98  02-16    PT/INR - ( 16 Feb 2019 18:15 )   PT: 11.4 sec;   INR: 1.01          PTT - ( 16 Feb 2019 18:15 )  PTT:32.0 sec  Urinalysis Basic - ( 16 Feb 2019 20:08 )    Color: Yellow / Appearance: Clear / SG: <=1.005 / pH: x  Gluc: x / Ketone: NEGATIVE  / Bili: Negative / Urobili: 0.2 E.U./dL   Blood: x / Protein: NEGATIVE mg/dL / Nitrite: POSITIVE   Leuk Esterase: Small / RBC: < 5 /HPF / WBC > 10 /HPF   Sq Epi: x / Non Sq Epi: 0-5 /HPF / Bacteria: Many /HPF

## 2019-02-17 NOTE — CONSULT NOTE ADULT - ASSESSMENT
Ms. Hamm is a 52 yo F who presents with abdominal pain likely secondary to early/partial bowel obstruction, now resolved.    - Clinically patient displays resolution of bowel obstruction. Patient is actively pass flatus on examination and improves significant improved of pain. Dilation of small bowel is similar when compared to CT in January of 2017 and may resemble chronic nature of dilation.  - Recommend PO challenge. If patient is able to tolerative without nausea, emesis, or abdominal pain patient may be discharged from ED.  - If any continued abdominal pain, nausea, emesis, or worsening symptoms, patient instructed to return to ED.  - Patient should follow up in Acute Care Surgery clinic ([phone]).  - Plan discussed with attending and chief resident.

## 2019-02-17 NOTE — CONSULT NOTE ADULT - ATTENDING COMMENTS
ATTENDING ADDENDUM:     pSBO with benign abdominal exam   passing flatus     PO trial and DC if patient tolerates    ROS negative for Constitutional, ENT, Cardiovascular, Respiratory, Gastrointestinal, Musculoskeletal, Skin, Ext, Neuro, Psych except what stated in HPI.  FH: noncontributory in first degree relatives  SH: per hpi   PE addendum:  Gen: NAD, resting comfortably in bed. Well developed, well groomed, appears stated age. Overweight  Neuro: CNII-XII grossly intact. AAOx3. Follows commands  HEENT: PERRL, EOMI, MMM  Pulm: CTAB, no respiratory distress, nonlabored breathing on RA  C/V: irregular, no MRG  Abd: Soft, NT/ND. No surgical scars. No tympany, no rebound, no guarding. Negative Izaguirre's  Extrem: WWP, no edema, nontender. No cyanosis, pallor. Palpable radial, PT bilaterally  Skin: No rashes noted  Psych: normal affect, responds appropriately to questions

## 2019-02-20 DIAGNOSIS — E78.5 HYPERLIPIDEMIA, UNSPECIFIED: ICD-10-CM

## 2019-02-20 DIAGNOSIS — E78.00 PURE HYPERCHOLESTEROLEMIA, UNSPECIFIED: ICD-10-CM

## 2019-02-20 DIAGNOSIS — Z88.5 ALLERGY STATUS TO NARCOTIC AGENT: ICD-10-CM

## 2019-02-20 DIAGNOSIS — R10.9 UNSPECIFIED ABDOMINAL PAIN: ICD-10-CM

## 2019-02-20 DIAGNOSIS — K56.7 ILEUS, UNSPECIFIED: ICD-10-CM

## 2019-02-20 DIAGNOSIS — I10 ESSENTIAL (PRIMARY) HYPERTENSION: ICD-10-CM

## 2019-11-01 ENCOUNTER — EMERGENCY (EMERGENCY)
Facility: HOSPITAL | Age: 54
LOS: 1 days | Discharge: HOME | End: 2019-11-01
Attending: EMERGENCY MEDICINE | Admitting: EMERGENCY MEDICINE
Payer: SELF-PAY

## 2019-11-01 VITALS
HEIGHT: 71 IN | TEMPERATURE: 98 F | HEART RATE: 81 BPM | WEIGHT: 229.94 LBS | RESPIRATION RATE: 18 BRPM | OXYGEN SATURATION: 100 % | SYSTOLIC BLOOD PRESSURE: 160 MMHG | DIASTOLIC BLOOD PRESSURE: 104 MMHG

## 2019-11-01 DIAGNOSIS — Z98.89 OTHER SPECIFIED POSTPROCEDURAL STATES: Chronic | ICD-10-CM

## 2019-11-01 DIAGNOSIS — Z98.0 INTESTINAL BYPASS AND ANASTOMOSIS STATUS: Chronic | ICD-10-CM

## 2019-11-01 DIAGNOSIS — K56.60 UNSPECIFIED INTESTINAL OBSTRUCTION: Chronic | ICD-10-CM

## 2019-11-01 DIAGNOSIS — Z98.890 OTHER SPECIFIED POSTPROCEDURAL STATES: Chronic | ICD-10-CM

## 2019-11-01 DIAGNOSIS — Z90.89 ACQUIRED ABSENCE OF OTHER ORGANS: Chronic | ICD-10-CM

## 2019-11-01 PROCEDURE — 99283 EMERGENCY DEPT VISIT LOW MDM: CPT | Mod: 25

## 2019-11-01 PROCEDURE — 99283 EMERGENCY DEPT VISIT LOW MDM: CPT

## 2019-11-01 PROCEDURE — 71046 X-RAY EXAM CHEST 2 VIEWS: CPT | Mod: 26

## 2019-11-01 PROCEDURE — 71046 X-RAY EXAM CHEST 2 VIEWS: CPT

## 2019-11-01 RX ORDER — HYDROCHLOROTHIAZIDE 25 MG
12.5 TABLET ORAL ONCE
Refills: 0 | Status: DISCONTINUED | OUTPATIENT
Start: 2019-11-01 | End: 2019-11-09

## 2019-11-01 NOTE — ED PROVIDER NOTE - OBJECTIVE STATEMENT
54 y/o F with PMHx HTN (not on meds), HLD, DVT 3y ago, PUD and pancreatitis presents to the ED with intermittent SOB x 1.5 years, worsening the past 3 months with increased dyspnea on exertion and wheezing after 1 flight of stairs. Denies chest pain, fever, chills, cough, dizziness, nausea, vomiting, swelling in legs, or recent travel. Pt reports her stress test and echo within the past year were normal. none 54 y/o F with PMHx HTN (not on meds x 1 year as her doctor took her off), HLD, DVT 3y ago, PUD and pancreatitis presents to the ED with intermittent SOB x 1.5 years, worsening the past 3 months with increased dyspnea on exertion and wheezing after 1 flight of stairs. Denies chest pain, fever, chills, cough, dizziness, nausea, vomiting, swelling in legs, or recent travel. Pt reports she saw a cardiologist  and had a stress test and echo within the past year were normal.

## 2019-11-01 NOTE — ED ADULT TRIAGE NOTE - OTHER COMPLAINTS
pt reports cough and wheezing for a couple of weeks, intermittent sob/hall "on and off for a while," some LE swelling noted. No chest pain, no wheezing noted on auscultation. able to speak in full sentences. no fevers.

## 2019-11-01 NOTE — ED PROVIDER NOTE - ATTENDING CONTRIBUTION TO CARE
54 y/o F with PMHx HTN (not on meds), HLD, DVT 3y ago, PUD and pancreatitis presents to the ED with intermittent SOB x 1.5 years, worsening the past 3 months with increased dyspnea on exertion and wheezing after 1 flight of stairs. Denies chest pain, fever, chills, cough, dizziness, nausea, vomiting, swelling in legs, or recent travel. Pt reports her stress test and echo within the past year were normal.    Agree with HPI and PE as documented by PA    Dyspnea worse with exertion  Will check cardiac labs  and cta chest r/o PE  if neg follow up with pmd -

## 2019-11-01 NOTE — ED PROVIDER NOTE - CLINICAL SUMMARY MEDICAL DECISION MAKING FREE TEXT BOX
52 y/o F with PMHx HTN (not on meds), HLD, DVT, PUD, and pancreatitis presents with intermittent SOB x 1y mostly when walking up steps, with dyspnea on exertion for the past 3 months, worsening with SOB after 1 flight of stairs. No fever, chills, or lower extremity swelling. Stress test and echo done last year was normal. BP elevated 160/104, O2 sat 100%. On exam, pt well appearing, lungs clear to auscultation b/l, no lower extremity edema. Will order CTA chest to r/o ACS vs PE.

## 2019-11-08 DIAGNOSIS — R06.2 WHEEZING: ICD-10-CM

## 2019-11-08 DIAGNOSIS — R06.00 DYSPNEA, UNSPECIFIED: ICD-10-CM

## 2021-10-13 NOTE — ED PROVIDER NOTE - ATTESTATION, MLM
Did You Provide Opioid Counseling: No I have reviewed and confirmed nurses' notes for patient's medications, allergies, medical history, and surgical history.

## 2023-11-15 NOTE — ED ADULT TRIAGE NOTE - AS HEIGHT TYPE
----- Message from Fatoumata Minor sent at 11/15/2023  4:38 PM CST -----  Type:  Test Results    Who Called:  Mari from [facility]    Name of Test (Lab/Mammo/Etc):  Critical Labs    Date of Test:  11/15    Ordering Provider:  Lino Mclean    Would the patient rather a call back or a response via [facility]?  Call back    Best Call Back Number:  [phone]    Additional Information:   Please call back to advise. Thanks!       stated

## 2024-01-06 NOTE — ED ADULT NURSE NOTE - PSH
06-Jan-2024 01:33
Bowel obstruction    H/O Billroth II operation    History of abdominoplasty    History of abdominoplasty    History of Billroth II operation    History of gastric surgery    History of tonsillectomy

## 2024-10-03 NOTE — ED PROVIDER NOTE - CPE EDP HEAD NORM PED
Ideally his HDL would be higher but since his LDL is low and he is exercising and getting enough fiber, there's nothing more he needs to do for this.    Head atraumatic, normal cephalic shape.